# Patient Record
Sex: MALE | Race: OTHER | Employment: FULL TIME | URBAN - METROPOLITAN AREA
[De-identification: names, ages, dates, MRNs, and addresses within clinical notes are randomized per-mention and may not be internally consistent; named-entity substitution may affect disease eponyms.]

---

## 2017-02-10 PROBLEM — M54.2 NECK PAIN: Status: ACTIVE | Noted: 2017-02-10

## 2017-02-10 PROBLEM — M62.838 NECK MUSCLE SPASM: Status: ACTIVE | Noted: 2017-02-10

## 2017-03-09 PROBLEM — Z00.00 HEALTHCARE MAINTENANCE: Status: ACTIVE | Noted: 2017-03-09

## 2018-08-13 ENCOUNTER — HOSPITAL ENCOUNTER (OUTPATIENT)
Age: 48
Discharge: HOME | End: 2018-08-13
Payer: COMMERCIAL

## 2018-08-13 LAB
ABSOLUTE EOS #: 0.3 K/UL (ref 0–0.4)
ABSOLUTE IMMATURE GRANULOCYTE: ABNORMAL K/UL (ref 0–0.3)
ABSOLUTE LYMPH #: 1.5 K/UL (ref 1–4.8)
ABSOLUTE MONO #: 0.4 K/UL (ref 0.2–0.8)
ALBUMIN SERPL-MCNC: 4.1 G/DL (ref 3.5–5.2)
ALBUMIN/GLOBULIN RATIO: NORMAL (ref 1–2.5)
ALP BLD-CCNC: 76 U/L (ref 40–129)
ALT SERPL-CCNC: 12 U/L (ref 5–41)
ANION GAP SERPL CALCULATED.3IONS-SCNC: 8 MMOL/L (ref 9–17)
AST SERPL-CCNC: 18 U/L
BASOPHILS # BLD: 0 % (ref 0–2)
BASOPHILS ABSOLUTE: 0 K/UL (ref 0–0.2)
BILIRUB SERPL-MCNC: 0.48 MG/DL (ref 0.3–1.2)
BILIRUBIN DIRECT: 0.08 MG/DL
BILIRUBIN, INDIRECT: 0.4 MG/DL (ref 0–1)
BUN BLDV-MCNC: 11 MG/DL (ref 6–20)
BUN/CREAT BLD: 18 (ref 9–20)
CALCIUM SERPL-MCNC: 9.2 MG/DL (ref 8.6–10.4)
CHLORIDE BLD-SCNC: 103 MMOL/L (ref 98–107)
CHOLESTEROL, FASTING: 202 MG/DL
CHOLESTEROL/HDL RATIO: 3.5
CO2: 27 MMOL/L (ref 20–31)
CREAT SERPL-MCNC: 0.62 MG/DL (ref 0.7–1.2)
DIFFERENTIAL TYPE: ABNORMAL
EOSINOPHILS RELATIVE PERCENT: 4 % (ref 1–4)
GFR AFRICAN AMERICAN: >60 ML/MIN
GFR NON-AFRICAN AMERICAN: >60 ML/MIN
GFR SERPL CREATININE-BSD FRML MDRD: ABNORMAL ML/MIN/{1.73_M2}
GFR SERPL CREATININE-BSD FRML MDRD: ABNORMAL ML/MIN/{1.73_M2}
GLOBULIN: NORMAL G/DL (ref 1.5–3.8)
GLUCOSE FASTING: 89 MG/DL (ref 70–99)
HCT VFR BLD CALC: 41.3 % (ref 41–53)
HDLC SERPL-MCNC: 57 MG/DL
HEMOGLOBIN: 13.7 G/DL (ref 13.5–17.5)
IMMATURE GRANULOCYTES: ABNORMAL %
LDL CHOLESTEROL: 130 MG/DL (ref 0–130)
LYMPHOCYTES # BLD: 21 % (ref 24–44)
MCH RBC QN AUTO: 26.8 PG (ref 26–34)
MCHC RBC AUTO-ENTMCNC: 33.1 G/DL (ref 31–37)
MCV RBC AUTO: 80.9 FL (ref 80–100)
MONOCYTES # BLD: 6 % (ref 1–7)
NRBC AUTOMATED: ABNORMAL PER 100 WBC
PDW BLD-RTO: 13.2 % (ref 11.5–14.5)
PLATELET # BLD: 209 K/UL (ref 130–400)
PLATELET ESTIMATE: ABNORMAL
PMV BLD AUTO: 8.4 FL (ref 6–12)
POTASSIUM SERPL-SCNC: 4.2 MMOL/L (ref 3.7–5.3)
RBC # BLD: 5.1 M/UL (ref 4.5–5.9)
RBC # BLD: ABNORMAL 10*6/UL
SEG NEUTROPHILS: 69 % (ref 36–66)
SEGMENTED NEUTROPHILS ABSOLUTE COUNT: 5 K/UL (ref 1.8–7.7)
SODIUM BLD-SCNC: 138 MMOL/L (ref 135–144)
TOTAL PROTEIN: 7.2 G/DL (ref 6.4–8.3)
TRIGLYCERIDE, FASTING: 75 MG/DL
VLDLC SERPL CALC-MCNC: ABNORMAL MG/DL (ref 1–30)
WBC # BLD: 7.3 K/UL (ref 3.5–11)
WBC # BLD: ABNORMAL 10*3/UL

## 2018-08-13 PROCEDURE — 80076 HEPATIC FUNCTION PANEL: CPT

## 2018-08-13 PROCEDURE — 85025 COMPLETE CBC W/AUTO DIFF WBC: CPT

## 2018-08-13 PROCEDURE — 80048 BASIC METABOLIC PNL TOTAL CA: CPT

## 2018-08-13 PROCEDURE — 36415 COLL VENOUS BLD VENIPUNCTURE: CPT

## 2018-08-13 PROCEDURE — 80061 LIPID PANEL: CPT

## 2018-09-26 PROBLEM — Z00.00 HEALTHCARE MAINTENANCE: Status: RESOLVED | Noted: 2017-03-09 | Resolved: 2018-09-26

## 2019-07-17 ENCOUNTER — HOSPITAL ENCOUNTER (OUTPATIENT)
Age: 49
Discharge: HOME | End: 2019-07-17
Payer: COMMERCIAL

## 2019-07-17 LAB
ALBUMIN SERPL-MCNC: 4 G/DL (ref 3.5–5.2)
ALBUMIN/GLOBULIN RATIO: 1.3 (ref 1–2.5)
ALP BLD-CCNC: 80 U/L (ref 40–129)
ALT SERPL-CCNC: 13 U/L (ref 5–41)
ANION GAP SERPL CALCULATED.3IONS-SCNC: 9 MMOL/L (ref 9–17)
AST SERPL-CCNC: 17 U/L
BILIRUB SERPL-MCNC: 0.35 MG/DL (ref 0.3–1.2)
BILIRUBIN DIRECT: 0.08 MG/DL
BILIRUBIN, INDIRECT: 0.27 MG/DL (ref 0–1)
BUN BLDV-MCNC: 11 MG/DL (ref 6–20)
BUN/CREAT BLD: NORMAL (ref 9–20)
CALCIUM SERPL-MCNC: 9 MG/DL (ref 8.6–10.4)
CHLORIDE BLD-SCNC: 105 MMOL/L (ref 98–107)
CHOLESTEROL, FASTING: 186 MG/DL
CHOLESTEROL/HDL RATIO: 3.6
CO2: 27 MMOL/L (ref 20–31)
CREAT SERPL-MCNC: 0.82 MG/DL (ref 0.7–1.2)
GFR AFRICAN AMERICAN: >60 ML/MIN
GFR NON-AFRICAN AMERICAN: >60 ML/MIN
GFR SERPL CREATININE-BSD FRML MDRD: NORMAL ML/MIN/{1.73_M2}
GFR SERPL CREATININE-BSD FRML MDRD: NORMAL ML/MIN/{1.73_M2}
GLOBULIN: NORMAL G/DL (ref 1.5–3.8)
GLUCOSE FASTING: 87 MG/DL (ref 70–99)
HDLC SERPL-MCNC: 51 MG/DL
LDL CHOLESTEROL: 123 MG/DL (ref 0–130)
POTASSIUM SERPL-SCNC: 4.6 MMOL/L (ref 3.7–5.3)
SODIUM BLD-SCNC: 141 MMOL/L (ref 135–144)
TOTAL PROTEIN: 7 G/DL (ref 6.4–8.3)
TRIGLYCERIDE, FASTING: 60 MG/DL
VLDLC SERPL CALC-MCNC: NORMAL MG/DL (ref 1–30)

## 2019-07-17 PROCEDURE — 36415 COLL VENOUS BLD VENIPUNCTURE: CPT

## 2019-07-17 PROCEDURE — 80076 HEPATIC FUNCTION PANEL: CPT

## 2019-07-17 PROCEDURE — 80061 LIPID PANEL: CPT

## 2019-07-17 PROCEDURE — 80048 BASIC METABOLIC PNL TOTAL CA: CPT

## 2020-07-02 ENCOUNTER — HOSPITAL ENCOUNTER (OUTPATIENT)
Age: 50
Discharge: HOME | End: 2020-07-02
Payer: COMMERCIAL

## 2020-07-02 LAB
ALBUMIN SERPL-MCNC: 3.9 G/DL (ref 3.5–5.2)
ALBUMIN/GLOBULIN RATIO: 1.6 (ref 1–2.5)
ALP BLD-CCNC: 64 U/L (ref 40–129)
ALT SERPL-CCNC: 11 U/L (ref 5–41)
ANION GAP SERPL CALCULATED.3IONS-SCNC: 10 MMOL/L (ref 9–17)
AST SERPL-CCNC: 16 U/L
BILIRUB SERPL-MCNC: 0.58 MG/DL (ref 0.3–1.2)
BILIRUBIN DIRECT: 0.11 MG/DL
BILIRUBIN, INDIRECT: 0.47 MG/DL (ref 0–1)
BUN BLDV-MCNC: 11 MG/DL (ref 6–20)
BUN/CREAT BLD: NORMAL (ref 9–20)
CALCIUM SERPL-MCNC: 8.7 MG/DL (ref 8.6–10.4)
CHLORIDE BLD-SCNC: 103 MMOL/L (ref 98–107)
CHOLESTEROL/HDL RATIO: 3.2
CHOLESTEROL: 168 MG/DL
CO2: 26 MMOL/L (ref 20–31)
CREAT SERPL-MCNC: 0.89 MG/DL (ref 0.7–1.2)
GFR AFRICAN AMERICAN: >60 ML/MIN
GFR NON-AFRICAN AMERICAN: >60 ML/MIN
GFR SERPL CREATININE-BSD FRML MDRD: NORMAL ML/MIN/{1.73_M2}
GFR SERPL CREATININE-BSD FRML MDRD: NORMAL ML/MIN/{1.73_M2}
GLOBULIN: NORMAL G/DL (ref 1.5–3.8)
GLUCOSE BLD-MCNC: 89 MG/DL (ref 70–99)
HDLC SERPL-MCNC: 53 MG/DL
LDL CHOLESTEROL: 101 MG/DL (ref 0–130)
POTASSIUM SERPL-SCNC: 4.4 MMOL/L (ref 3.7–5.3)
SODIUM BLD-SCNC: 139 MMOL/L (ref 135–144)
TOTAL PROTEIN: 6.4 G/DL (ref 6.4–8.3)
TRIGL SERPL-MCNC: 69 MG/DL
VLDLC SERPL CALC-MCNC: NORMAL MG/DL (ref 1–30)

## 2020-07-02 PROCEDURE — 80076 HEPATIC FUNCTION PANEL: CPT

## 2020-07-02 PROCEDURE — 36415 COLL VENOUS BLD VENIPUNCTURE: CPT

## 2020-07-02 PROCEDURE — 80048 BASIC METABOLIC PNL TOTAL CA: CPT

## 2020-07-02 PROCEDURE — 80061 LIPID PANEL: CPT

## 2021-01-28 ENCOUNTER — HOSPITAL ENCOUNTER (OUTPATIENT)
Age: 51
Setting detail: SPECIMEN
Discharge: HOME | End: 2021-01-28
Payer: COMMERCIAL

## 2021-01-28 LAB
ABSOLUTE EOS #: 0.2 K/UL (ref 0–0.44)
ABSOLUTE IMMATURE GRANULOCYTE: <0.03 K/UL (ref 0–0.3)
ABSOLUTE LYMPH #: 1.34 K/UL (ref 1.1–3.7)
ABSOLUTE MONO #: 0.54 K/UL (ref 0.1–1.2)
ALBUMIN SERPL-MCNC: 3.8 G/DL (ref 3.5–5.2)
ALBUMIN/GLOBULIN RATIO: 1.3 (ref 1–2.5)
ALP BLD-CCNC: 80 U/L (ref 40–129)
ALT SERPL-CCNC: 11 U/L (ref 5–41)
ANION GAP SERPL CALCULATED.3IONS-SCNC: 8 MMOL/L (ref 9–17)
AST SERPL-CCNC: 16 U/L
BASOPHILS # BLD: 1 % (ref 0–2)
BASOPHILS ABSOLUTE: 0.05 K/UL (ref 0–0.2)
BILIRUB SERPL-MCNC: 0.42 MG/DL (ref 0.3–1.2)
BUN BLDV-MCNC: 11 MG/DL (ref 6–20)
BUN/CREAT BLD: ABNORMAL (ref 9–20)
CALCIUM SERPL-MCNC: 9.1 MG/DL (ref 8.6–10.4)
CHLORIDE BLD-SCNC: 103 MMOL/L (ref 98–107)
CO2: 29 MMOL/L (ref 20–31)
CREAT SERPL-MCNC: 0.77 MG/DL (ref 0.7–1.2)
DIFFERENTIAL TYPE: ABNORMAL
EOSINOPHILS RELATIVE PERCENT: 2 % (ref 1–4)
GFR AFRICAN AMERICAN: >60 ML/MIN
GFR NON-AFRICAN AMERICAN: >60 ML/MIN
GFR SERPL CREATININE-BSD FRML MDRD: ABNORMAL ML/MIN/{1.73_M2}
GFR SERPL CREATININE-BSD FRML MDRD: ABNORMAL ML/MIN/{1.73_M2}
GLUCOSE BLD-MCNC: 90 MG/DL (ref 70–99)
HCT VFR BLD CALC: 42.4 % (ref 40.7–50.3)
HEMOGLOBIN: 13.3 G/DL (ref 13–17)
IMMATURE GRANULOCYTES: 0 %
LYMPHOCYTES # BLD: 16 % (ref 24–43)
MCH RBC QN AUTO: 26.1 PG (ref 25.2–33.5)
MCHC RBC AUTO-ENTMCNC: 31.4 G/DL (ref 28.4–34.8)
MCV RBC AUTO: 83.1 FL (ref 82.6–102.9)
MONOCYTES # BLD: 6 % (ref 3–12)
NRBC AUTOMATED: 0 PER 100 WBC
PDW BLD-RTO: 12.6 % (ref 11.8–14.4)
PLATELET # BLD: 198 K/UL (ref 138–453)
PLATELET ESTIMATE: ABNORMAL
PMV BLD AUTO: 11 FL (ref 8.1–13.5)
POTASSIUM SERPL-SCNC: 4.3 MMOL/L (ref 3.7–5.3)
RBC # BLD: 5.1 M/UL (ref 4.21–5.77)
RBC # BLD: ABNORMAL 10*6/UL
SEG NEUTROPHILS: 75 % (ref 36–65)
SEGMENTED NEUTROPHILS ABSOLUTE COUNT: 6.26 K/UL (ref 1.5–8.1)
SODIUM BLD-SCNC: 140 MMOL/L (ref 135–144)
T3 FREE: 3.01 PG/ML (ref 2.02–4.43)
THYROXINE, FREE: 1.23 NG/DL (ref 0.93–1.7)
TOTAL PROTEIN: 6.7 G/DL (ref 6.4–8.3)
TSH SERPL DL<=0.05 MIU/L-ACNC: 0.87 MIU/L (ref 0.3–5)
VITAMIN D 25-HYDROXY: 59.1 NG/ML (ref 30–100)
WBC # BLD: 8.4 K/UL (ref 3.5–11.3)
WBC # BLD: ABNORMAL 10*3/UL

## 2021-01-28 PROCEDURE — 80053 COMPREHEN METABOLIC PANEL: CPT

## 2021-01-28 PROCEDURE — 84481 FREE ASSAY (FT-3): CPT

## 2021-01-28 PROCEDURE — 85025 COMPLETE CBC W/AUTO DIFF WBC: CPT

## 2021-01-28 PROCEDURE — 82306 VITAMIN D 25 HYDROXY: CPT

## 2021-01-28 PROCEDURE — 84439 ASSAY OF FREE THYROXINE: CPT

## 2021-01-28 PROCEDURE — 84443 ASSAY THYROID STIM HORMONE: CPT

## 2021-01-28 PROCEDURE — 36415 COLL VENOUS BLD VENIPUNCTURE: CPT

## 2021-02-02 ENCOUNTER — HOSPITAL ENCOUNTER (OUTPATIENT)
Dept: GENERAL RADIOLOGY | Age: 51
Discharge: HOME | End: 2021-02-04
Payer: COMMERCIAL

## 2021-02-02 ENCOUNTER — HOSPITAL ENCOUNTER (OUTPATIENT)
Age: 51
Discharge: HOME | End: 2021-02-04
Payer: COMMERCIAL

## 2021-02-02 DIAGNOSIS — M54.2 CERVICALGIA: ICD-10-CM

## 2021-02-02 PROCEDURE — 72040 X-RAY EXAM NECK SPINE 2-3 VW: CPT

## 2021-03-19 ENCOUNTER — IMMUNIZATION (OUTPATIENT)
Dept: PRIMARY CARE CLINIC | Age: 51
End: 2021-03-19
Payer: COMMERCIAL

## 2021-03-19 PROCEDURE — 0001A COVID-19, PFIZER VACCINE 30MCG/0.3ML DOSE: CPT | Performed by: INTERNAL MEDICINE

## 2021-03-19 PROCEDURE — 91300 COVID-19, PFIZER VACCINE 30MCG/0.3ML DOSE: CPT | Performed by: INTERNAL MEDICINE

## 2021-04-12 ENCOUNTER — IMMUNIZATION (OUTPATIENT)
Dept: PRIMARY CARE CLINIC | Age: 51
End: 2021-04-12
Payer: COMMERCIAL

## 2021-04-12 PROCEDURE — 0002A COVID-19, PFIZER VACCINE 30MCG/0.3ML DOSE: CPT | Performed by: INTERNAL MEDICINE

## 2021-04-12 PROCEDURE — 91300 COVID-19, PFIZER VACCINE 30MCG/0.3ML DOSE: CPT | Performed by: INTERNAL MEDICINE

## 2021-09-28 ENCOUNTER — APPOINTMENT (OUTPATIENT)
Dept: CT IMAGING | Age: 51
End: 2021-09-28
Payer: COMMERCIAL

## 2021-09-28 ENCOUNTER — HOSPITAL ENCOUNTER (EMERGENCY)
Age: 51
Discharge: HOME | End: 2021-09-29
Attending: EMERGENCY MEDICINE
Payer: COMMERCIAL

## 2021-09-28 DIAGNOSIS — T81.31XA DEHISCENCE OF OPERATIVE WOUND, INITIAL ENCOUNTER: Primary | ICD-10-CM

## 2021-09-28 DIAGNOSIS — L03.90 CELLULITIS, UNSPECIFIED CELLULITIS SITE: ICD-10-CM

## 2021-09-28 LAB
ABSOLUTE EOS #: 0.18 K/UL (ref 0–0.44)
ABSOLUTE IMMATURE GRANULOCYTE: 0.05 K/UL (ref 0–0.3)
ABSOLUTE LYMPH #: 1.52 K/UL (ref 1.1–3.7)
ABSOLUTE MONO #: 0.46 K/UL (ref 0.1–1.2)
ANION GAP SERPL CALCULATED.3IONS-SCNC: 8 MMOL/L (ref 9–17)
BASOPHILS # BLD: 1 % (ref 0–2)
BASOPHILS ABSOLUTE: 0.05 K/UL (ref 0–0.2)
BUN BLDV-MCNC: 19 MG/DL (ref 6–20)
BUN/CREAT BLD: 27 (ref 9–20)
CALCIUM SERPL-MCNC: 9.6 MG/DL (ref 8.6–10.4)
CHLORIDE BLD-SCNC: 102 MMOL/L (ref 98–107)
CO2: 29 MMOL/L (ref 20–31)
CREAT SERPL-MCNC: 0.71 MG/DL (ref 0.7–1.2)
DIFFERENTIAL TYPE: ABNORMAL
EOSINOPHILS RELATIVE PERCENT: 2 % (ref 1–4)
GFR AFRICAN AMERICAN: >60 ML/MIN
GFR NON-AFRICAN AMERICAN: >60 ML/MIN
GFR SERPL CREATININE-BSD FRML MDRD: ABNORMAL ML/MIN/{1.73_M2}
GFR SERPL CREATININE-BSD FRML MDRD: ABNORMAL ML/MIN/{1.73_M2}
GLUCOSE BLD-MCNC: 93 MG/DL (ref 70–99)
HCT VFR BLD CALC: 37.5 % (ref 40.7–50.3)
HEMOGLOBIN: 11.8 G/DL (ref 13–17)
IMMATURE GRANULOCYTES: 1 %
LYMPHOCYTES # BLD: 18 % (ref 24–43)
MCH RBC QN AUTO: 26 PG (ref 25.2–33.5)
MCHC RBC AUTO-ENTMCNC: 31.5 G/DL (ref 28.4–34.8)
MCV RBC AUTO: 82.6 FL (ref 82.6–102.9)
MONOCYTES # BLD: 6 % (ref 3–12)
NRBC AUTOMATED: 0 PER 100 WBC
PDW BLD-RTO: 12.9 % (ref 11.8–14.4)
PLATELET # BLD: 280 K/UL (ref 138–453)
PLATELET ESTIMATE: ABNORMAL
PMV BLD AUTO: 10 FL (ref 8.1–13.5)
POTASSIUM SERPL-SCNC: 4.1 MMOL/L (ref 3.7–5.3)
RBC # BLD: 4.54 M/UL (ref 4.21–5.77)
RBC # BLD: ABNORMAL 10*6/UL
SEG NEUTROPHILS: 72 % (ref 36–65)
SEGMENTED NEUTROPHILS ABSOLUTE COUNT: 6 K/UL (ref 1.5–8.1)
SODIUM BLD-SCNC: 139 MMOL/L (ref 135–144)
WBC # BLD: 8.3 K/UL (ref 3.5–11.3)
WBC # BLD: ABNORMAL 10*3/UL

## 2021-09-28 PROCEDURE — 80048 BASIC METABOLIC PNL TOTAL CA: CPT

## 2021-09-28 PROCEDURE — 85025 COMPLETE CBC W/AUTO DIFF WBC: CPT

## 2021-09-28 PROCEDURE — 70487 CT MAXILLOFACIAL W/DYE: CPT

## 2021-09-28 PROCEDURE — 6360000004 HC RX CONTRAST MEDICATION: Performed by: NURSE PRACTITIONER

## 2021-09-28 PROCEDURE — 99283 EMERGENCY DEPT VISIT LOW MDM: CPT

## 2021-09-28 PROCEDURE — 2580000003 HC RX 258: Performed by: NURSE PRACTITIONER

## 2021-09-28 RX ORDER — CEPHALEXIN 500 MG/1
500 CAPSULE ORAL 3 TIMES DAILY
Qty: 21 CAPSULE | Refills: 0 | Status: SHIPPED | OUTPATIENT
Start: 2021-09-28 | End: 2021-10-11 | Stop reason: ALTCHOICE

## 2021-09-28 RX ORDER — SODIUM CHLORIDE 0.9 % (FLUSH) 0.9 %
10 SYRINGE (ML) INJECTION ONCE
Status: COMPLETED | OUTPATIENT
Start: 2021-09-28 | End: 2021-09-28

## 2021-09-28 RX ORDER — 0.9 % SODIUM CHLORIDE 0.9 %
80 INTRAVENOUS SOLUTION INTRAVENOUS ONCE
Status: COMPLETED | OUTPATIENT
Start: 2021-09-28 | End: 2021-09-28

## 2021-09-28 RX ADMIN — SODIUM CHLORIDE 80 ML: 9 INJECTION, SOLUTION INTRAVENOUS at 22:26

## 2021-09-28 RX ADMIN — IOPAMIDOL 75 ML: 755 INJECTION, SOLUTION INTRAVENOUS at 22:25

## 2021-09-28 RX ADMIN — SODIUM CHLORIDE, PRESERVATIVE FREE 10 ML: 5 INJECTION INTRAVENOUS at 22:26

## 2021-09-28 ASSESSMENT — ENCOUNTER SYMPTOMS
SORE THROAT: 0
CONSTIPATION: 0
VOMITING: 0
SHORTNESS OF BREATH: 0
WHEEZING: 0
COUGH: 0
SINUS PRESSURE: 0
RHINORRHEA: 0
COLOR CHANGE: 0
ABDOMINAL PAIN: 0
DIARRHEA: 0
NAUSEA: 0

## 2021-09-28 NOTE — LETTER
[facility] ED  [address] [address]  Phone: [phone]             September 28, 2021    Patient: Angelina Medina   YOB: 1970   Date of Visit: 9/28/2021       To Whom It May Concern:    Angelina Medina was seen and treated in our emergency department on 9/28/2021.  He may return to work on October 4,2021      Sincerely,             Signature:__________________________________

## 2021-09-29 VITALS
RESPIRATION RATE: 16 BRPM | SYSTOLIC BLOOD PRESSURE: 160 MMHG | BODY MASS INDEX: 25.18 KG/M2 | TEMPERATURE: 97.8 F | WEIGHT: 190 LBS | OXYGEN SATURATION: 100 % | HEIGHT: 73 IN | HEART RATE: 74 BPM | DIASTOLIC BLOOD PRESSURE: 89 MMHG

## 2021-09-29 NOTE — ED PROVIDER NOTES
eMERGENCY dEPARTMENT eNCOUnter   Independent Attestation     Pt Name: Fernanda Lawson  MRN: [id number]  [place] 1970  Date of evaluation: 9/28/21     Fernanda Lawson is a 45 y.o. male with CC: Wound Check ([place] 2 weeks ago and stitches around ears have come out )      Based on the medical record the care appears appropriate. I was personally available for consultation in the Emergency Department.     Jacob Steel DO  Attending Emergency Physician                    Minesh Benavides [id number],   09/28/21 4458

## 2021-09-29 NOTE — ED PROVIDER NOTES
[facility] ED  eMERGENCY dEPARTMENT eNCOUnter      Pt Name: Troy Mills  MRN: [id number]  [place] 1970  Date of evaluation: 9/28/2021  Provider: Wilfredo Vanegas NP, MO - Giulia 1063       Chief Complaint   Patient presents with    Wound Check     [place] ([facility]) 2 weeks ago and stitches around ears have come out          HISTORY OF PRESENT ILLNESS  (Location/Symptom, Timing/Onset, Context/Setting, Quality, Duration, Modifying Factors, Severity.)   Troy Mills is a 45 y.o. male who presents to the emergency department vehicle for evaluation of dehiscence of wounds on his face. Patient had [place] facel[place] ([facility]) 2 weeks ago. He states along with his wounds have dehisced. He has a small open wound behind his right ear. He also has a 2 wounds to his left ear one behind and one in front. He states it has been draining some blood intermittently. He denies any associated fevers or chills. Nursing Notes were reviewed. ALLERGIES     Cat hair extract and Dust mite extract    CURRENT MEDICATIONS       Previous Medications    No medications on file       PAST MEDICAL HISTORY         Diagnosis Date    Asthma     Mediastinal mass        SURGICAL HISTORY           Procedure Laterality Date    APPENDECTOMY  2000    HERNIA REPAIR  2008    TONSILLECTOMY  2002         FAMILY HISTORY           Problem Relation Age of Onset    Heart Disease Mother     Diabetes Unknown     Cancer Father     Stroke Mother      No family status information on file. SOCIAL HISTORY      reports that he has never smoked. He has never used smokeless tobacco. He reports that he does not drink alcohol and does not use drugs. REVIEW OF SYSTEMS    (2-9 systems for level 4, 10 or more for level 5)     Review of Systems   Constitutional: Negative for chills, fever and unexpected weight change. HENT: Negative for congestion, rhinorrhea, sinus pressure and sore throat.     Respiratory: Negative for cough, shortness of breath and wheezing. Cardiovascular: Negative for chest pain and palpitations. Gastrointestinal: Negative for abdominal pain, constipation, diarrhea, nausea and vomiting. Genitourinary: Negative for dysuria and hematuria. Musculoskeletal: Negative for arthralgias and myalgias. Skin: Positive for wound. Negative for color change and rash. Neurological: Negative for dizziness, weakness and headaches. Hematological: Negative for adenopathy. All other systems reviewed and are negative. Except as noted above the remainder of the review of systems was reviewed and negative. PHYSICAL EXAM    (up to 7 for level 4, 8 or more for level 5)     ED Triage Vitals [09/28/21 2116]   BP Temp Temp src Pulse Resp SpO2 Height Weight   (!) 160/89 -- -- 74 16 100 % 6' 1\" (1.854 m) 190 lb (86.2 kg)       Physical Exam  Vitals reviewed. Constitutional:       Appearance: He is well-developed. HENT:      Head: Normocephalic and atraumatic. Eyes:      Conjunctiva/sclera: Conjunctivae normal.      Pupils: Pupils are equal, round, and reactive to light. Cardiovascular:      Rate and Rhythm: Normal rate and regular rhythm. Pulmonary:      Effort: Pulmonary effort is normal. No respiratory distress. Breath sounds: Normal breath sounds. No stridor. Abdominal:      General: Bowel sounds are normal.      Palpations: Abdomen is soft. Musculoskeletal:         General: Normal range of motion. Cervical back: Normal range of motion and neck supple. Lymphadenopathy:      Cervical: No cervical adenopathy. Skin:     General: Skin is warm and dry. Findings: No rash. Neurological:      Mental Status: He is alert and oriented to person, place, and time.            DIAGNOSTIC RESULTS     RADIOLOGY:   Non-plain film images such as CT, Ultrasound and MRI are read by the radiologist. Plain radiographic images are visualized and preliminarily interpreted by the emergency MEDICATIONS:     New Prescriptions    CEPHALEXIN (KEFLEX) 500 MG CAPSULE    Take 1 capsule by mouth 3 times daily           (Please note that portions of this note were completed with a voice recognition program.  Efforts were made to edit the dictations but occasionally words are mis-transcribed.)    [facility] NP, APRN - CNP  Certified Nurse Practitioner        MO Kearney CNP  09/28/21 0176

## 2021-09-30 ENCOUNTER — HOSPITAL ENCOUNTER (OUTPATIENT)
Dept: WOUND CARE | Age: 51
Discharge: HOME | End: 2021-09-30
Payer: COMMERCIAL

## 2021-09-30 DIAGNOSIS — T81.89XD SURGICAL WOUND, NON HEALING, SUBSEQUENT ENCOUNTER: Primary | ICD-10-CM

## 2021-09-30 DIAGNOSIS — S01.302D OPEN WOUND OF LEFT EAR, UNSPECIFIED OPEN WOUND TYPE, SUBSEQUENT ENCOUNTER: ICD-10-CM

## 2021-09-30 PROBLEM — S01.302A OPEN WOUND OF LEFT EAR: Status: ACTIVE | Noted: 2021-09-30

## 2021-09-30 PROBLEM — S01.309A EAR WOUND: Status: ACTIVE | Noted: 2021-09-30

## 2021-09-30 PROCEDURE — 99213 OFFICE O/P EST LOW 20 MIN: CPT

## 2021-09-30 PROCEDURE — 11042 DBRDMT SUBQ TIS 1ST 20SQCM/<: CPT | Performed by: NURSE PRACTITIONER

## 2021-09-30 PROCEDURE — 11042 DBRDMT SUBQ TIS 1ST 20SQCM/<: CPT

## 2021-09-30 PROCEDURE — 99202 OFFICE O/P NEW SF 15 MIN: CPT | Performed by: NURSE PRACTITIONER

## 2021-09-30 RX ORDER — LIDOCAINE HYDROCHLORIDE 20 MG/ML
JELLY TOPICAL ONCE
Status: COMPLETED | OUTPATIENT
Start: 2021-09-30 | End: 2021-09-30

## 2021-09-30 RX ORDER — LIDOCAINE HYDROCHLORIDE 20 MG/ML
JELLY TOPICAL ONCE
Status: CANCELLED | OUTPATIENT
Start: 2021-09-30 | End: 2021-09-30

## 2021-09-30 RX ORDER — LIDOCAINE HYDROCHLORIDE 40 MG/ML
SOLUTION TOPICAL ONCE
Status: CANCELLED | OUTPATIENT
Start: 2021-09-30 | End: 2021-09-30

## 2021-09-30 RX ORDER — METOPROLOL SUCCINATE 50 MG/1
50 TABLET, EXTENDED RELEASE ORAL DAILY
COMMUNITY
Start: 2021-08-02

## 2021-09-30 RX ORDER — TIZANIDINE 4 MG/1
4 TABLET ORAL 2 TIMES DAILY PRN
COMMUNITY
Start: 2021-07-28

## 2021-09-30 RX ADMIN — LIDOCAINE HYDROCHLORIDE 6 ML: 20 JELLY TOPICAL at 11:37

## 2021-09-30 NOTE — PROGRESS NOTES
[address]   Progress Note and Procedure Note      [facility] RECORD NUMBER:  [id number]  AGE: 45 y.o. GENDER: male  : 1970  EPISODE DATE:  2021    Subjective:     Chief Complaint   Patient presents with    Wound Check     bilateral posterior ears         HISTORY of PRESENT ILLNESS HPI     Ramírez Holland is a 45 y.o. male who presents today for wound/ulcer evaluation. History of Wound Context: presents to wound clinic today for evaluation of bilateral posterior non healing surgical wounds after facelift in [facility] [place] 2 weeks ago. Currently keeping wounds covered. Was seen in ER 2021 for wound dehiscence, started on keflex which he is taking as prescribed.    Wound/Ulcer Pain Timing/Severity: intermittent  Quality of pain: aching, burning  Severity:  3 / 10   Modifying Factors: Pain worsens with debridement  Associated Signs/Symptoms: none    Ulcer Identification:  Ulcer Type: non-healing surgical  Contributing Factors: none    Wound: N/A        PAST MEDICAL HISTORY        Diagnosis Date    Asthma     Mediastinal mass        PAST SURGICAL HISTORY    Past Surgical History:   Procedure Laterality Date    APPENDECTOMY  2000    HERNIA REPAIR  2008    TONSILLECTOMY  2002       FAMILY HISTORY    Family History   Problem Relation Age of Onset    Heart Disease Mother     Stroke Mother     Diabetes Other     Cancer Father        SOCIAL HISTORY    Social History     Tobacco Use    Smoking status: Never Smoker    Smokeless tobacco: Never Used   Substance Use Topics    Alcohol use: No    Drug use: No       ALLERGIES    Allergies   Allergen Reactions    Cat Hair Extract     Dust Mite Extract        MEDICATIONS    Current Outpatient Medications on File Prior to Encounter   Medication Sig Dispense Refill    metoprolol succinate (TOPROL XL) 50 MG extended release tablet Take 50 mg by mouth daily      tiZANidine (ZANAFLEX) 4 MG tablet Take 4 mg by mouth 2 times daily as needed      cephALEXin (KEFLEX) 500 MG capsule Take 1 capsule by mouth 3 times daily 21 capsule 0     No current facility-administered medications on file prior to encounter. REVIEW OF SYSTEMS    Constitutional: negative  Eyes: negative  Ears, nose, mouth, throat, and face: negative  Respiratory: negative  Cardiovascular: negative  Gastrointestinal: negative  Genitourinary:negative  Integument/breast: negative except for bilateral posterior ear wounds   Hematologic/lymphatic: negative  Musculoskeletal:negative  Neurological: negative  Behavioral/Psych: negative  Endocrine: negative  Allergic/Immunologic: negative    Objective: There were no vitals taken for this visit.     Wt Readings from Last 3 Encounters:   09/28/21 190 lb (86.2 kg)   03/15/17 205 lb 6 oz (93.2 kg)   03/09/17 204 lb 8 oz (92.8 kg)       PHYSICAL EXAM    General Appearance: alert and oriented to person, place and time, well developed and well- nourished, in no acute distress  Skin: warm and dry, no rash or erythema, bilateral posterior ear wounds   Head: normocephalic and atraumatic  Eyes: pupils equal, round, extraocular eye movements intact, and conjunctivae normal  Pulmonary/Chest: clear to auscultation bilaterally- no wheezes, rales or rhonchi, normal air movement, no respiratory distress  Cardiovascular: normal rate, regular rhythm, normal S1 and S2, no murmurs  Abdomen: soft, non-tender, non-distended, normal bowel sounds  Extremities: no cyanosis, clubbing or edema  Musculoskeletal: no joint swelling, deformity or tenderness  Neurologic: gait, coordination and speech normal      Assessment:     Problem List Items Addressed This Visit     Ear wound    Relevant Orders    Initiate Outpatient Wound Care Protocol    Surgical wound, non healing, subsequent encounter - Primary    Relevant Orders    Initiate Outpatient Wound Care Protocol           Procedure Note  Indications:  Based on my examination of this patient's wound(s)/ulcer(s) today, debridement is required to promote healing and evaluate the wound base. Performed by: MO Milner CNP    Consent obtained:  Yes    Time out taken:  Yes    Pain Control: Anesthetic  Anesthetic: 2% Lidocaine Gel Topical     Debridement:Excisional Debridement    Using curette the wound(s)/ulcer(s) was/were sharply debrided down through and including the removal of subcutaneous tissue. Devitalized Tissue Debrided:  fibrin, biofilm and slough    Pre Debridement Measurements:  Are located in the [facility]  Documentation Flow Sheet    Wound/Ulcer #: 1 and 2    Post Debridement Measurements:  Wound/Ulcer Descriptions are Pre Debridement except measurements:    Wound 09/30/21 Ear Lower;Right;Posterior #1 Right ear (Active)   Wound Image   09/30/21 1111   Wound Etiology Surgical 09/30/21 1111   Wound Cleansed Cleansed with saline 09/30/21 1111   Wound Length (cm) 2.2 cm 09/30/21 1111   Wound Width (cm) 0.8 cm 09/30/21 1111   Wound Depth (cm) 0.6 cm 09/30/21 1111   Wound Surface Area (cm^2) 1.76 cm^2 09/30/21 1111   Wound Volume (cm^3) 1. 056 cm^3 09/30/21 1111   Post-Procedure Length (cm) 2.2 cm 09/30/21 1111   Post-Procedure Width (cm) 0.8 cm 09/30/21 1111   Post-Procedure Depth (cm) 0.6 cm 09/30/21 1111   Post-Procedure Surface Area (cm^2) 1.76 cm^2 09/30/21 1111   Post-Procedure Volume (cm^3) 1. 056 cm^3 09/30/21 1111   Wound Assessment Pink/red;Slough 09/30/21 1111   Drainage Amount Moderate 09/30/21 1111   Drainage Description Purulent 09/30/21 1111   Odor Mild 09/30/21 1111   Maria Del Carmen-wound Assessment Blanchable erythema 09/30/21 1111   Margins Defined edges 09/30/21 1111   Wound Thickness Description not for Pressure Injury Full thickness 09/30/21 1111   Number of days: 0       Wound 09/30/21 Ear Left; Lower; Posterior #2 Left ear (Active)   Wound Image   09/30/21 1111   Wound Etiology Surgical 09/30/21 1111   Wound Cleansed Irrigated with saline 09/30/21 1111   Wound Length (cm) 3.5 cm 09/30/21 1111   Wound Width (cm) 0.9 cm 09/30/21 1111   Wound Depth (cm) 0.4 cm 09/30/21 1111   Wound Surface Area (cm^2) 3.15 cm^2 09/30/21 1111   Wound Volume (cm^3) 1.26 cm^3 09/30/21 1111   Post-Procedure Length (cm) 3.5 cm 09/30/21 1111   Post-Procedure Width (cm) 0.9 cm 09/30/21 1111   Post-Procedure Depth (cm) 0.4 cm 09/30/21 1111   Post-Procedure Surface Area (cm^2) 3.15 cm^2 09/30/21 1111   Post-Procedure Volume (cm^3) 1.26 cm^3 09/30/21 1111   Undermining Starts ___ O'Clock 6 09/30/21 1111   Undermining Ends___ O'Clock 9 09/30/21 1111   Undermining Maxium Distance (cm) 1.0 @7 09/30/21 1111   Wound Assessment Pink/red 09/30/21 1111   Drainage Amount Moderate 09/30/21 1111   Drainage Description Serosanguinous 09/30/21 1111   Odor None 09/30/21 1111   Maria Del Carmen-wound Assessment Blanchable erythema 09/30/21 1111   Margins Defined edges 09/30/21 1111   Wound Thickness Description not for Pressure Injury Full thickness 09/30/21 1111   Number of days: 0          Percent of Wound(s)/Ulcer(s) Debrided: 100%    Total Surface Area Debrided:  4.91 sq cm     Estimated Blood Loss:  Minimal    Hemostasis Achieved:  by pressure    Procedural Pain:  3  / 10     Post Procedural Pain:  0 / 10     Response to treatment:  Well tolerated by patient. Plan:     Treatment Note please see Discharge Instructions    Written patient dismissal instructions given to patient and signed by patient or POA.            Electronically signed by MO Prince CNP on 9/30/2021 at 11:37 AM

## 2021-10-04 ENCOUNTER — HOSPITAL ENCOUNTER (OUTPATIENT)
Dept: WOUND CARE | Age: 51
Discharge: HOME | End: 2021-10-04
Payer: COMMERCIAL

## 2021-10-04 VITALS
DIASTOLIC BLOOD PRESSURE: 82 MMHG | RESPIRATION RATE: 18 BRPM | BODY MASS INDEX: 25.18 KG/M2 | SYSTOLIC BLOOD PRESSURE: 147 MMHG | WEIGHT: 190 LBS | HEIGHT: 73 IN | HEART RATE: 79 BPM | TEMPERATURE: 97.9 F

## 2021-10-04 DIAGNOSIS — T81.89XD SURGICAL WOUND, NON HEALING, SUBSEQUENT ENCOUNTER: Primary | ICD-10-CM

## 2021-10-04 DIAGNOSIS — S01.302D OPEN WOUND OF LEFT EAR, UNSPECIFIED OPEN WOUND TYPE, SUBSEQUENT ENCOUNTER: ICD-10-CM

## 2021-10-04 PROCEDURE — 11042 DBRDMT SUBQ TIS 1ST 20SQCM/<: CPT | Performed by: PLASTIC SURGERY

## 2021-10-04 PROCEDURE — 11045 DBRDMT SUBQ TISS EACH ADDL: CPT | Performed by: PLASTIC SURGERY

## 2021-10-04 PROCEDURE — 11042 DBRDMT SUBQ TIS 1ST 20SQCM/<: CPT

## 2021-10-04 PROCEDURE — 11045 DBRDMT SUBQ TISS EACH ADDL: CPT

## 2021-10-04 RX ORDER — LIDOCAINE HYDROCHLORIDE 20 MG/ML
JELLY TOPICAL ONCE
Status: CANCELLED | OUTPATIENT
Start: 2021-10-04 | End: 2021-10-04

## 2021-10-04 RX ORDER — LIDOCAINE HYDROCHLORIDE 40 MG/ML
SOLUTION TOPICAL ONCE
Status: CANCELLED | OUTPATIENT
Start: 2021-10-04 | End: 2021-10-04

## 2021-10-04 RX ORDER — LIDOCAINE HYDROCHLORIDE 20 MG/ML
JELLY TOPICAL ONCE
Status: COMPLETED | OUTPATIENT
Start: 2021-10-04 | End: 2021-10-04

## 2021-10-04 RX ADMIN — LIDOCAINE HYDROCHLORIDE 6 ML: 20 JELLY TOPICAL at 10:04

## 2021-10-04 ASSESSMENT — PAIN DESCRIPTION - ORIENTATION: ORIENTATION: RIGHT

## 2021-10-04 ASSESSMENT — PAIN DESCRIPTION - ONSET: ONSET: ON-GOING

## 2021-10-04 ASSESSMENT — PAIN - FUNCTIONAL ASSESSMENT: PAIN_FUNCTIONAL_ASSESSMENT: ACTIVITIES ARE NOT PREVENTED

## 2021-10-04 ASSESSMENT — PAIN DESCRIPTION - PROGRESSION: CLINICAL_PROGRESSION: NOT CHANGED

## 2021-10-04 ASSESSMENT — PAIN DESCRIPTION - DESCRIPTORS: DESCRIPTORS: ACHING

## 2021-10-04 ASSESSMENT — PAIN DESCRIPTION - FREQUENCY: FREQUENCY: INTERMITTENT

## 2021-10-04 ASSESSMENT — PAIN DESCRIPTION - LOCATION: LOCATION: EAR

## 2021-10-04 ASSESSMENT — PAIN SCALES - GENERAL: PAINLEVEL_OUTOF10: 5

## 2021-10-04 ASSESSMENT — PAIN DESCRIPTION - PAIN TYPE: TYPE: ACUTE PAIN

## 2021-10-04 NOTE — PLAN OF CARE
Problem: Pain:  Description: Pain management should include both nonpharmacologic and pharmacologic interventions.   Goal: Pain level will decrease  Description: Pain level will decrease  Outcome: Ongoing  Goal: Control of acute pain  Description: Control of acute pain  Outcome: Ongoing  Goal: Control of chronic pain  Description: Control of chronic pain  Outcome: Ongoing     Problem: Wound:  Goal: Will show signs of wound healing; wound closure and no evidence of infection  Description: Will show signs of wound healing; wound closure and no evidence of infection  Outcome: Ongoing     Problem: Falls - Risk of:  Goal: Will remain free from falls  Description: Will remain free from falls  Outcome: Ongoing

## 2021-10-04 NOTE — LETTER
WORK RELEASE  [facility] WOUND [facility]ARE  [address] [phone]  Dept: [address] RECORD NUMBER: [id number]   AGE: 45 y.o. GENDER: male : 1970   TODAYS DATE: 10/4/2021       Mr. Dank Lazo was seen in the [facility] on 10/4/2021     Patient may not return to work until he is reevaluated on 10/11/2021. If you have any questions please feel free to contact us at the above number.       Sincerely,       Jenny Diaz MD on 10/4/2021 at 10:40 AM    [address] [place] [address] Pkwy and [facility]

## 2021-10-04 NOTE — PROGRESS NOTES
[address]       Progress Note and Procedure Note      Progress note     Chief Complaint   Patient presents with    Wound Check     bilateral ears        HPI:   Jovany Knapp is a 45 y.o. male who presents status post facelift. Patient has weakness on the right face. Patient also has open wounds postauricular and inferior auricular. Patient is here to discuss all the issues. Patient has burning associated with it. The pain worsens with debridement. Medications:     Current Outpatient Medications   Medication Sig Dispense Refill    metoprolol succinate (TOPROL XL) 50 MG extended release tablet Take 50 mg by mouth daily      tiZANidine (ZANAFLEX) 4 MG tablet Take 4 mg by mouth 2 times daily as needed      cephALEXin (KEFLEX) 500 MG capsule Take 1 capsule by mouth 3 times daily 21 capsule 0     No current facility-administered medications for this encounter. Allergies: Allergies   Allergen Reactions    Cat Hair Extract     Dust Mite Extract      Review of Systems:   Constitutional: Negative for fever, chills, fatigue and unexpected weight change. HENT: Negative for hearing loss, sore throat and facial swelling. Eyes: Negative for pain and discharge. Respiratory: Patient has a history of asthma. Cardiovascular: Negative for chest pain. Gastrointestinal: Negative for nausea, vomiting, diarrhea and constipation. Skin: Negative for pallor and rash. Neurological: Negative for seizures, syncope and numbness. Hematological: Does not bruise/bleed easily. Psychiatric/Behavioral: Negative for behavioral problems. The patient is not nervous/anxious.       Past Medical History:   Diagnosis Date    Asthma     Mediastinal mass      Past Surgical History:   Procedure Laterality Date    APPENDECTOMY  2000    HERNIA REPAIR  2008    TONSILLECTOMY  2002     Social History     Socioeconomic History    Marital status:      Spouse name: Not on file    distress. Neurological:  Patient has weakness on the right side of the face over the facial nerve starting from the zygomatic branch to the mandibular branch. Psychiatric: Normal mood and affect. Behavior is normal      Post Debridement Measurements:  Wound/Ulcer Descriptions are Pre Debridement except measurements:    Wound 09/30/21 Ear Right; Lower; Posterior #1 CLUSTER (Active)   Wound Image   09/30/21 1111   Wound Etiology Non-Healing Surgical 10/04/21 1000   Dressing Status New drainage noted; Old drainage noted 10/04/21 1000   Wound Cleansed Cleansed with saline 10/04/21 1000   Wound Length (cm) 6 cm 10/04/21 1000   Wound Width (cm) 3 cm 10/04/21 1000   Wound Depth (cm) 0.3 cm 10/04/21 1000   Wound Surface Area (cm^2) 18 cm^2 10/04/21 1000   Change in Wound Size % (l*w) -922.73 10/04/21 1000   Wound Volume (cm^3) 5.4 cm^3 10/04/21 1000   Wound Healing % -411 10/04/21 1000   Post-Procedure Length (cm) 6 cm 10/04/21 1000   Post-Procedure Width (cm) 3 cm 10/04/21 1000   Post-Procedure Depth (cm) 0.3 cm 10/04/21 1000   Post-Procedure Surface Area (cm^2) 18 cm^2 10/04/21 1000   Post-Procedure Volume (cm^3) 5.4 cm^3 10/04/21 1000   Wound Assessment Pink/red;Slough 10/04/21 1000   Drainage Amount Moderate 10/04/21 1000   Drainage Description Serosanguinous 10/04/21 1000   Odor None 10/04/21 1000   Maria Del Carmen-wound Assessment Blanchable erythema 10/04/21 1000   Margins Defined edges 10/04/21 1000   Wound Thickness Description not for Pressure Injury Full thickness 10/04/21 1000   Number of days: 3       Wound 09/30/21 Ear Left; Lower; Posterior #2  (Active)   Wound Image   09/30/21 1111   Wound Etiology Non-Healing Surgical 10/04/21 1000   Dressing Status New drainage noted; Old drainage noted 10/04/21 1000   Wound Cleansed Irrigated with saline 09/30/21 1111   Wound Length (cm) 3.2 cm 10/04/21 1000   Wound Width (cm) 1.6 cm 10/04/21 1000   Wound Depth (cm) 0.1 cm 10/04/21 1000   Wound Surface Area (cm^2) 5.12 cm^2 10/04/21 1000   Change in Wound Size % (l*w) -62.54 10/04/21 1000   Wound Volume (cm^3) 0.512 cm^3 10/04/21 1000   Wound Healing % 59 10/04/21 1000   Post-Procedure Length (cm) 3.2 cm 10/04/21 1000   Post-Procedure Width (cm) 1.6 cm 10/04/21 1000   Post-Procedure Depth (cm) 0.1 cm 10/04/21 1000   Post-Procedure Surface Area (cm^2) 5.12 cm^2 10/04/21 1000   Post-Procedure Volume (cm^3) 0.512 cm^3 10/04/21 1000   Undermining Starts ___ O'Clock 7 10/04/21 1000   Undermining Ends___ O'Clock 9 10/04/21 1000   Undermining Maxium Distance (cm) 0.8 @7 10/04/21 1000   Wound Assessment Pink/red 10/04/21 1000   Drainage Amount Moderate 10/04/21 1000   Drainage Description Serosanguinous 10/04/21 1000   Odor None 10/04/21 1000   Maria Del Carmen-wound Assessment Blanchable erythema 10/04/21 1000   Margins Defined edges 10/04/21 1000   Wound Thickness Description not for Pressure Injury Full thickness 10/04/21 1000   Number of days: 3          Procedure Note  Indications:  Based on my examination of this patient's wound(s)/ulcer(s) today, debridement is required to promote healing and evaluate the wound base. Performed by: Tonie Peters MD    Consent obtained:  Yes    Time out taken:  Yes    Pain Control: Anesthetic  Anesthetic: 2% Lidocaine Gel Topical       Percent of Wound(s)/Ulcer(s) Debrided: 100%    Total Surface Area Debrided: Right posterior ear 18 sq cm to the subcutaneous tissue, left posterior ear 5.2 cm² to subcutaneous    Diabetic/Pressure/Non Pressure Ulcers only:  Ulcer: Non-Pressure ulcer, fat layer exposed      Estimated Blood Loss:  Minimal    Hemostasis Achieved:  by pressure    Procedural Pain:  0  / 10     Post Procedural Pain:  0 / 10     Response to treatment:  Well tolerated by patient.          Imaging:   [unfilled]        Impression/Plan:     Problem List Items Addressed This Visit     Surgical wound, non healing, subsequent encounter - Primary    Relevant Orders    Initiate Outpatient Wound Care Protocol    Ear wound    Relevant Orders    Initiate Outpatient Wound Care Protocol          Patient Active Problem List   Diagnosis    Asthma    Mediastinal mass    Dyslipidemia    Uncomplicated asthma    Neck pain    Neck muscle spasm    Surgical wound, non healing, subsequent encounter    Ear wound     Plan:  Patient will make an appointment with neurology to perform studies such as EMG. We will place Lizzie and Zantac. Patient is to follow-up in 1 week. Patient was given time off for 1 week.        Electronically signed by:  Timothy Rendon MD 10/4/2021

## 2021-10-04 NOTE — PROGRESS NOTES
[address]:     [employer] [id number] [address] p: [phone] f: [phone]     Ordering Center:     [facility]  Brayan Miramontes 124 [address] [facility]  [phone]  WOUND CARE Dept: [phone]   FAX NUMBER [unfilled]    Patient Information:      Jovany Kochlichristofer Pickering.  Bruce Covarrubias  [phone]   : 1970  AGE: 45 y.o. GENDER: male   TODAYS DATE:  10/4/2021    Insurance:      PRIMARY INSURANCE:  Plan: BCBS OUT OF STATE  Coverage: BCBS  Effective Date: 2015  [id number] - (BX Traditional)      Patient Wound Information:      Problem List Items Addressed This Visit     Surgical wound, non healing, subsequent encounter - Primary    Relevant Orders    Initiate Outpatient Wound Care Protocol    Ear wound    Relevant Orders    Initiate Outpatient Wound Care Protocol          WOUNDS REQUIRING DRESSING SUPPLIES:     Wound 21 Ear Right; Lower; Posterior #1 CLUSTER (Active)   Wound Image   21 1111   Wound Etiology Non-Healing Surgical 10/04/21 1000   Dressing Status New drainage noted; Old drainage noted 10/04/21 1000   Wound Cleansed Cleansed with saline 10/04/21 1000   Wound Length (cm) 6 cm 10/04/21 1000   Wound Width (cm) 3 cm 10/04/21 1000   Wound Depth (cm) 0.3 cm 10/04/21 1000   Wound Surface Area (cm^2) 18 cm^2 10/04/21 1000   Change in Wound Size % (l*w) -922.73 10/04/21 1000   Wound Volume (cm^3) 5.4 cm^3 10/04/21 1000   Wound Healing % -411 10/04/21 1000   Post-Procedure Length (cm) 6 cm 10/04/21 1000   Post-Procedure Width (cm) 3 cm 10/04/21 1000   Post-Procedure Depth (cm) 0.3 cm 10/04/21 1000   Post-Procedure Surface Area (cm^2) 18 cm^2 10/04/21 1000   Post-Procedure Volume (cm^3) 5.4 cm^3 10/04/21 1000   Wound Assessment Pink/red;Slough 10/04/21 1000   Drainage Amount Moderate 10/04/21 1000   Drainage Description Serosanguinous 10/04/21 1000   Odor None 10/04/21 1000   Maria Del Carmen-wound Assessment Blanchable erythema 10/04/21 1000   Margins Defined edges 10/04/21 1000   Wound Thickness Description not for Pressure Injury Full thickness 10/04/21 1000   Number of days: 3       Wound 09/30/21 Ear Left; Lower; Posterior #2  (Active)   Wound Image   09/30/21 1111   Wound Etiology Non-Healing Surgical 10/04/21 1000   Dressing Status New drainage noted; Old drainage noted 10/04/21 1000   Wound Cleansed Irrigated with saline 09/30/21 1111   Wound Length (cm) 3.2 cm 10/04/21 1000   Wound Width (cm) 1.6 cm 10/04/21 1000   Wound Depth (cm) 0.1 cm 10/04/21 1000   Wound Surface Area (cm^2) 5.12 cm^2 10/04/21 1000   Change in Wound Size % (l*w) -62.54 10/04/21 1000   Wound Volume (cm^3) 0.512 cm^3 10/04/21 1000   Wound Healing % 59 10/04/21 1000   Post-Procedure Length (cm) 3.2 cm 10/04/21 1000   Post-Procedure Width (cm) 1.6 cm 10/04/21 1000   Post-Procedure Depth (cm) 0.1 cm 10/04/21 1000   Post-Procedure Surface Area (cm^2) 5.12 cm^2 10/04/21 1000   Post-Procedure Volume (cm^3) 0.512 cm^3 10/04/21 1000   Undermining Starts ___ O'Clock 7 10/04/21 1000   Undermining Ends___ O'Clock 9 10/04/21 1000   Undermining Maxium Distance (cm) 0.8 @7 10/04/21 1000   Wound Assessment Pink/red 10/04/21 1000   Drainage Amount Moderate 10/04/21 1000   Drainage Description Serosanguinous 10/04/21 1000   Odor None 10/04/21 1000   Maria Del Carmen-wound Assessment Blanchable erythema 10/04/21 1000   Margins Defined edges 10/04/21 1000   Wound Thickness Description not for Pressure Injury Full thickness 10/04/21 1000   Number of days: 3          Supplies Requested :      WOUND #: 1 and 2   PRIMARY DRESSING:  Collagen with silver (tom)   Cover and Secure with:  Other Gentac or comparable silicone gauze dressing     FREQUENCY OF DRESSING CHANGES:  Daily         ADDITIONAL ITEMS:  [] Gloves Small  [x] Gloves Medium [] Gloves Large [] Gloves XLarge  [] Tape 1\" [x] Tape 2\" [] Tape 3\"  [] Medipore Tape  [x] Saline  [] Skin Prep   [] Adhesive Remover

## 2021-10-11 ENCOUNTER — HOSPITAL ENCOUNTER (OUTPATIENT)
Dept: WOUND CARE | Age: 51
Discharge: HOME | End: 2021-10-11
Payer: COMMERCIAL

## 2021-10-11 VITALS
BODY MASS INDEX: 25.18 KG/M2 | RESPIRATION RATE: 17 BRPM | DIASTOLIC BLOOD PRESSURE: 65 MMHG | HEART RATE: 71 BPM | TEMPERATURE: 97.6 F | WEIGHT: 190 LBS | SYSTOLIC BLOOD PRESSURE: 123 MMHG | HEIGHT: 73 IN

## 2021-10-11 DIAGNOSIS — S01.302D OPEN WOUND OF LEFT EAR, UNSPECIFIED OPEN WOUND TYPE, SUBSEQUENT ENCOUNTER: ICD-10-CM

## 2021-10-11 DIAGNOSIS — T81.89XD SURGICAL WOUND, NON HEALING, SUBSEQUENT ENCOUNTER: Primary | ICD-10-CM

## 2021-10-11 PROCEDURE — 99213 OFFICE O/P EST LOW 20 MIN: CPT | Performed by: PLASTIC SURGERY

## 2021-10-11 PROCEDURE — 99213 OFFICE O/P EST LOW 20 MIN: CPT

## 2021-10-11 RX ORDER — LIDOCAINE HYDROCHLORIDE 40 MG/ML
SOLUTION TOPICAL ONCE
Status: CANCELLED | OUTPATIENT
Start: 2021-10-11 | End: 2021-10-11

## 2021-10-11 RX ORDER — LIDOCAINE HYDROCHLORIDE 20 MG/ML
JELLY TOPICAL ONCE
Status: COMPLETED | OUTPATIENT
Start: 2021-10-11 | End: 2021-10-11

## 2021-10-11 RX ORDER — LIDOCAINE HYDROCHLORIDE 20 MG/ML
JELLY TOPICAL ONCE
Status: CANCELLED | OUTPATIENT
Start: 2021-10-11 | End: 2021-10-11

## 2021-10-11 RX ADMIN — LIDOCAINE HYDROCHLORIDE 6 ML: 20 JELLY TOPICAL at 08:46

## 2021-10-11 ASSESSMENT — PAIN DESCRIPTION - ONSET: ONSET: ON-GOING

## 2021-10-11 ASSESSMENT — PAIN DESCRIPTION - ORIENTATION: ORIENTATION: RIGHT;LEFT

## 2021-10-11 ASSESSMENT — PAIN SCALES - GENERAL: PAINLEVEL_OUTOF10: 3

## 2021-10-11 ASSESSMENT — PAIN DESCRIPTION - DESCRIPTORS: DESCRIPTORS: ACHING

## 2021-10-11 ASSESSMENT — PAIN - FUNCTIONAL ASSESSMENT: PAIN_FUNCTIONAL_ASSESSMENT: ACTIVITIES ARE NOT PREVENTED

## 2021-10-11 ASSESSMENT — PAIN DESCRIPTION - PROGRESSION: CLINICAL_PROGRESSION: NOT CHANGED

## 2021-10-11 ASSESSMENT — PAIN DESCRIPTION - FREQUENCY: FREQUENCY: INTERMITTENT

## 2021-10-11 ASSESSMENT — PAIN DESCRIPTION - PAIN TYPE: TYPE: ACUTE PAIN

## 2021-10-11 ASSESSMENT — PAIN DESCRIPTION - LOCATION: LOCATION: EAR

## 2021-10-11 NOTE — LETTER
WORK RELEASE  [facility]   [address] [place] [address]  [phone]  Dept: [address] RECORD NUMBER: [id number]   AGE: 45 y.o. GENDER: male : 1970   TODAYS DATE: 10/11/2021       Mr. Jamie Finn was seen in the [facility] [address] on 10/11/2021     Patient may remain off of work until he is reevaluated on 10/18/2021.       Sincerely,      Robert Soriano MD      [address] [place] [address] Pkwy and [facility] Therapy

## 2021-10-11 NOTE — PROGRESS NOTES
[address]       Progress Note and Procedure Note      Progress note     Chief Complaint   Patient presents with    Wound Check     b/l ears        HPI:   Meenakshi Miramontes is a 45 y.o. male who presents status post facelift. Patient has weakness on the right face. Patient also has open wounds postauricular and inferior auricular. Patient is here to discuss all the issues. Patient has burning associated with it. The pain worsens with debridement. Medications:     Current Outpatient Medications   Medication Sig Dispense Refill    metoprolol succinate (TOPROL XL) 50 MG extended release tablet Take 50 mg by mouth daily      tiZANidine (ZANAFLEX) 4 MG tablet Take 4 mg by mouth 2 times daily as needed       No current facility-administered medications for this encounter. Allergies: Allergies   Allergen Reactions    Cat Hair Extract     Dust Mite Extract      Review of Systems:   Constitutional: Negative for fever, chills, fatigue and unexpected weight change. HENT: Negative for hearing loss, sore throat and facial swelling. Eyes: Negative for pain and discharge. Respiratory: Patient has a history of asthma. Cardiovascular: Negative for chest pain. Gastrointestinal: Negative for nausea, vomiting, diarrhea and constipation. Skin: Negative for pallor and rash. Neurological: Negative for seizures, syncope and numbness. Hematological: Does not bruise/bleed easily. Psychiatric/Behavioral: Negative for behavioral problems. The patient is not nervous/anxious.       Past Medical History:   Diagnosis Date    Asthma     Mediastinal mass      Past Surgical History:   Procedure Laterality Date    APPENDECTOMY  2000    HERNIA REPAIR  2008    TONSILLECTOMY  2002     Social History     Socioeconomic History    Marital status:      Spouse name: Not on file    Number of children: Not on file    Years of education: Not on file    Highest education level: Not on file   Occupational History    Not on file   Tobacco Use    Smoking status: Never Smoker    Smokeless tobacco: Never Used   Vaping Use    Vaping Use: Never used   Substance and Sexual Activity    Alcohol use: No    Drug use: No    Sexual activity: Not on file   Other Topics Concern    Not on file   Social History Narrative    Not on file     Social Determinants of Health     Financial Resource Strain:     Difficulty of Paying Living Expenses:    Food Insecurity:     Worried About Running Out of Food in the Last Year:     [address] in the Last Year:    Transportation Needs:     Lack of Transportation (Medical):  Lack of Transportation (Non-Medical):    Physical Activity:     Days of Exercise per Week:     Minutes of Exercise per Session:    Stress:     Feeling of Stress :    Social Connections:     Frequency of Communication with Friends and Family:     Frequency of Social Gatherings with Friends and Family:     Attends Hinduism Services:     Active Member of Clubs or Organizations:     Attends Club or Organization Meetings:     Marital Status:    Intimate Partner Violence:     Fear of Current or Ex-Partner:     Emotionally Abused:     Physically Abused:     Sexually Abused:      Family History   Problem Relation Age of Onset    Heart Disease Mother     Stroke Mother     Diabetes Other     Cancer Father      Physical Exam:   /65   Pulse 71   Temp 97.6 °F (36.4 °C) (Tympanic)   Resp 17   Ht 6' 1\" (1.854 m)   Wt 190 lb (86.2 kg)   BMI 25.07 kg/m²    Body mass index is 25.07 kg/m². Physical Exam   Nursing note and vitals reviewed. Constitutional: Oriented to person, place, and time. Appears well-developed and well-nourished. No distress. Head: Normocephalic and atraumatic. Eyes: Conjunctivae and EOM are normal.   Pulmonary/Chest: Effort normal. No respiratory distress.    Neurological:  Patient has weakness on the right side of the face over the facial nerve starting from the zygomatic branch to the mandibular branch. Psychiatric: Normal mood and affect. Behavior is normal      Post Debridement Measurements:  Wound/Ulcer Descriptions are Pre Debridement except measurements:    Wound 09/30/21 Ear Right; Lower; Posterior #1 CLUSTER (Active)   Wound Image   09/30/21 1111   Wound Etiology Non-Healing Surgical 10/11/21 0846   Dressing Status New drainage noted; Old drainage noted 10/11/21 0846   Wound Cleansed Cleansed with saline 10/11/21 0846   Wound Length (cm) 4 cm 10/11/21 0846   Wound Width (cm) 0.2 cm 10/11/21 0846   Wound Depth (cm) 0.1 cm 10/11/21 0846   Wound Surface Area (cm^2) 0.8 cm^2 10/11/21 0846   Change in Wound Size % (l*w) 54.55 10/11/21 0846   Wound Volume (cm^3) 0.08 cm^3 10/11/21 0846   Wound Healing % 92 10/11/21 0846   Post-Procedure Length (cm) 4 cm 10/11/21 0846   Post-Procedure Width (cm) 0.2 cm 10/11/21 0846   Post-Procedure Depth (cm) 0.1 cm 10/11/21 0846   Post-Procedure Surface Area (cm^2) 0.8 cm^2 10/11/21 0846   Post-Procedure Volume (cm^3) 0.08 cm^3 10/11/21 0846   Wound Assessment Pink/red;Slough 10/11/21 0846   Drainage Amount Moderate 10/11/21 0846   Drainage Description Serosanguinous 10/11/21 0846   Odor None 10/11/21 0846   Maria Del Carmen-wound Assessment Intact 10/11/21 0846   Margins Defined edges 10/11/21 0846   Wound Thickness Description not for Pressure Injury Full thickness 10/11/21 0846   Number of days: 10       Wound 09/30/21 Ear Left; Lower; Posterior #2  (Active)   Wound Image   09/30/21 1111   Wound Etiology Non-Healing Surgical 10/11/21 0846   Dressing Status New drainage noted; Old drainage noted 10/11/21 0846   Wound Cleansed Cleansed with saline 10/11/21 0846   Wound Length (cm) 3.1 cm 10/11/21 0846   Wound Width (cm) 0.6 cm 10/11/21 0846   Wound Depth (cm) 0.1 cm 10/11/21 0846   Wound Surface Area (cm^2) 1.86 cm^2 10/11/21 0846   Change in Wound Size % (l*w) 40.95 10/11/21 0846   Wound Volume (cm^3) 0.186 cm^3 10/11/21 7473 Wound Healing % 85 10/11/21 0846   Post-Procedure Length (cm) 3.1 cm 10/11/21 0846   Post-Procedure Width (cm) 0.6 cm 10/11/21 0846   Post-Procedure Depth (cm) 0.1 cm 10/11/21 0846   Post-Procedure Surface Area (cm^2) 1.86 cm^2 10/11/21 0846   Post-Procedure Volume (cm^3) 0.186 cm^3 10/11/21 0846   Undermining Starts ___ O'Clock 7 10/04/21 1000   Undermining Ends___ O'Clock 9 10/04/21 1000   Undermining Maxium Distance (cm) 0.8 @7 10/04/21 1000   Wound Assessment Pink/red 10/11/21 0846   Drainage Amount Moderate 10/11/21 0846   Drainage Description Serosanguinous 10/11/21 0846   Odor None 10/11/21 0846   Maria Del Carmen-wound Assessment Intact 10/11/21 0846   Margins Defined edges 10/11/21 0846   Wound Thickness Description not for Pressure Injury Full thickness 10/11/21 0846   Number of days: 10                Imaging:   [unfilled]        Impression/Plan:     Problem List Items Addressed This Visit     Surgical wound, non healing, subsequent encounter - Primary    Relevant Orders    Initiate Outpatient Wound Care Protocol    Ear wound    Relevant Orders    Initiate Outpatient Wound Care Protocol          Patient Active Problem List   Diagnosis    Asthma    Mediastinal mass    Dyslipidemia    Uncomplicated asthma    Neck pain    Neck muscle spasm    Surgical wound, non healing, subsequent encounter    Ear wound     Plan:  Patient has an appointment with a neurologist this week. .  We will place Lizzie and Gentac. Patient is to follow-up in 1 week. I recommended he should ask his operating surgeon regarding his lifting restrictions. I will give him 1 more week off of work.            Electronically signed by:  Deshaun Benitez MD 10/11/2021

## 2021-10-18 ENCOUNTER — HOSPITAL ENCOUNTER (OUTPATIENT)
Dept: WOUND CARE | Age: 51
Discharge: HOME | End: 2021-10-18
Payer: COMMERCIAL

## 2021-10-18 VITALS
SYSTOLIC BLOOD PRESSURE: 146 MMHG | DIASTOLIC BLOOD PRESSURE: 83 MMHG | HEART RATE: 87 BPM | HEIGHT: 73 IN | BODY MASS INDEX: 25.18 KG/M2 | TEMPERATURE: 98.2 F | RESPIRATION RATE: 18 BRPM | WEIGHT: 190 LBS

## 2021-10-18 DIAGNOSIS — S01.302D OPEN WOUND OF LEFT EAR, UNSPECIFIED OPEN WOUND TYPE, SUBSEQUENT ENCOUNTER: ICD-10-CM

## 2021-10-18 DIAGNOSIS — T81.89XD SURGICAL WOUND, NON HEALING, SUBSEQUENT ENCOUNTER: Primary | ICD-10-CM

## 2021-10-18 PROCEDURE — 11042 DBRDMT SUBQ TIS 1ST 20SQCM/<: CPT

## 2021-10-18 PROCEDURE — 11042 DBRDMT SUBQ TIS 1ST 20SQCM/<: CPT | Performed by: PLASTIC SURGERY

## 2021-10-18 RX ORDER — DIAZEPAM 5 MG/1
5 TABLET ORAL EVERY 6 HOURS PRN
COMMUNITY

## 2021-10-18 RX ORDER — LIDOCAINE HYDROCHLORIDE 40 MG/ML
SOLUTION TOPICAL ONCE
Status: CANCELLED | OUTPATIENT
Start: 2021-10-18 | End: 2021-10-18

## 2021-10-18 RX ORDER — MELOXICAM 15 MG/1
15 TABLET ORAL DAILY
COMMUNITY

## 2021-10-18 RX ORDER — LIDOCAINE HYDROCHLORIDE 20 MG/ML
JELLY TOPICAL ONCE
Status: COMPLETED | OUTPATIENT
Start: 2021-10-18 | End: 2021-10-18

## 2021-10-18 RX ORDER — MELOXICAM 7.5 MG/1
7.5 TABLET ORAL DAILY
COMMUNITY

## 2021-10-18 RX ORDER — LIDOCAINE HYDROCHLORIDE 20 MG/ML
JELLY TOPICAL ONCE
Status: CANCELLED | OUTPATIENT
Start: 2021-10-18 | End: 2021-10-18

## 2021-10-18 RX ORDER — CYCLOBENZAPRINE HCL 5 MG
5 TABLET ORAL 3 TIMES DAILY
COMMUNITY

## 2021-10-18 RX ADMIN — LIDOCAINE HYDROCHLORIDE 6 ML: 20 JELLY TOPICAL at 08:35

## 2021-10-18 NOTE — PROGRESS NOTES
[address]       Progress Note and Procedure Note      Progress note     Chief Complaint   Patient presents with    Wound Check     bilateral ears        HPI:   Jovany Knapp is a 45 y.o. male who presents status post facelift. Patient has weakness on the right face. Patient also has open wounds postauricular and inferior auricular. Patient is here to discuss all the issues. Patient has burning associated with it. The pain worsens with debridement. Medications:     Current Outpatient Medications   Medication Sig Dispense Refill    diazePAM (VALIUM) 5 MG tablet Take 5 mg by mouth every 6 hours as needed.  meloxicam (MOBIC) 15 MG tablet Take 15 mg by mouth daily      cyclobenzaprine (FLEXERIL) 5 MG tablet Take 5 mg by mouth 3 times daily      meloxicam (MOBIC) 7.5 MG tablet Take 7.5 mg by mouth daily      metoprolol succinate (TOPROL XL) 50 MG extended release tablet Take 50 mg by mouth daily      tiZANidine (ZANAFLEX) 4 MG tablet Take 4 mg by mouth 2 times daily as needed       No current facility-administered medications for this encounter. Allergies: Allergies   Allergen Reactions    Cat Hair Extract     Dust Mite Extract      Review of Systems:   Constitutional: Negative for fever, chills, fatigue and unexpected weight change. HENT: Negative for hearing loss, sore throat and facial swelling. Eyes: Negative for pain and discharge. Respiratory: Patient has a history of asthma. Cardiovascular: Negative for chest pain. Gastrointestinal: Negative for nausea, vomiting, diarrhea and constipation. Skin: Negative for pallor and rash. Neurological: Negative for seizures, syncope and numbness. Hematological: Does not bruise/bleed easily. Psychiatric/Behavioral: Negative for behavioral problems. The patient is not nervous/anxious.       Past Medical History:   Diagnosis Date    Asthma     Mediastinal mass      Past Surgical History:   Procedure Laterality Date    APPENDECTOMY  2000    HERNIA REPAIR  2008    TONSILLECTOMY  2002     Social History     Socioeconomic History    Marital status:      Spouse name: Not on file    Number of children: Not on file    Years of education: Not on file    Highest education level: Not on file   Occupational History    Not on file   Tobacco Use    Smoking status: Never Smoker    Smokeless tobacco: Never Used   Vaping Use    Vaping Use: Never used   Substance and Sexual Activity    Alcohol use: No    Drug use: No    Sexual activity: Not on file   Other Topics Concern    Not on file   Social History Narrative    Not on file     Social Determinants of Health     Financial Resource Strain:     Difficulty of Paying Living Expenses:    Food Insecurity:     Worried About Running Out of Food in the Last Year:     Rachel of Food in the Last Year:    Transportation Needs:     Lack of Transportation (Medical):  Lack of Transportation (Non-Medical):    Physical Activity:     Days of Exercise per Week:     Minutes of Exercise per Session:    Stress:     Feeling of Stress :    Social Connections:     Frequency of Communication with Friends and Family:     Frequency of Social Gatherings with Friends and Family:     Attends Worship Services:     Active Member of Clubs or Organizations:     Attends Club or Organization Meetings:     Marital Status:    Intimate Partner Violence:     Fear of Current or Ex-Partner:     Emotionally Abused:     Physically Abused:     Sexually Abused:      Family History   Problem Relation Age of Onset    Heart Disease Mother     Stroke Mother     Diabetes Other     Cancer Father      Physical Exam:   BP (!) 146/83   Pulse 87   Temp 98.2 °F (36.8 °C) (Tympanic)   Resp 18   Ht 6' 1\" (1.854 m)   Wt 190 lb (86.2 kg)   BMI 25.07 kg/m²    Body mass index is 25.07 kg/m². Physical Exam   Nursing note and vitals reviewed.   Constitutional: Oriented to person, place, and time. Appears well-developed and well-nourished. No distress. Head: Normocephalic and atraumatic. Eyes: Conjunctivae and EOM are normal.   Pulmonary/Chest: Effort normal. No respiratory distress. Neurological:  Patient has weakness on the right side of the face over the facial nerve starting from the zygomatic branch to the mandibular branch. Psychiatric: Normal mood and affect. Behavior is normal      Post Debridement Measurements:  Wound/Ulcer Descriptions are Pre Debridement except measurements:    Wound 09/30/21 Ear Right; Lower; Posterior #1 CLUSTER (Active)   Wound Image   09/30/21 1111   Wound Etiology Non-Healing Surgical 10/18/21 2323   Dressing Status New drainage noted; Old drainage noted 10/18/21 0833   Wound Cleansed Cleansed with saline 10/18/21 0833   Wound Length (cm) 1.8 cm 10/18/21 0833   Wound Width (cm) 0.2 cm 10/18/21 0833   Wound Depth (cm) 0.1 cm 10/18/21 0833   Wound Surface Area (cm^2) 0.36 cm^2 10/18/21 0833   Change in Wound Size % (l*w) 79.55 10/18/21 0833   Wound Volume (cm^3) 0.036 cm^3 10/18/21 0833   Wound Healing % 97 10/18/21 0833   Post-Procedure Length (cm) 1.8 cm 10/18/21 0833   Post-Procedure Width (cm) 0.2 cm 10/18/21 0833   Post-Procedure Depth (cm) 0.1 cm 10/18/21 0833   Post-Procedure Surface Area (cm^2) 0.36 cm^2 10/18/21 0833   Post-Procedure Volume (cm^3) 0.036 cm^3 10/18/21 0833   Wound Assessment Pink/red;Slough 10/18/21 0833   Drainage Amount Moderate 10/18/21 0833   Drainage Description Serosanguinous 10/18/21 0833   Odor None 10/18/21 0833   Maria Del Carmen-wound Assessment Intact 10/18/21 0833   Margins Defined edges 10/18/21 0833   Wound Thickness Description not for Pressure Injury Full thickness 10/18/21 0833   Number of days: 17       Wound 09/30/21 Ear Left; Lower; Posterior #2  (Active)   Wound Image   09/30/21 1111   Wound Etiology Non-Healing Surgical 10/18/21 9908   Dressing Status New drainage noted; Old drainage noted 10/18/21 0833   Wound Cleansed Cleansed with saline 10/18/21 0833   Wound Length (cm) 1.3 cm 10/18/21 0833   Wound Width (cm) 1.2 cm 10/18/21 0833   Wound Depth (cm) 0.3 cm 10/18/21 0833   Wound Surface Area (cm^2) 1.56 cm^2 10/18/21 0833   Change in Wound Size % (l*w) 50.48 10/18/21 0833   Wound Volume (cm^3) 0.468 cm^3 10/18/21 0833   Wound Healing % 63 10/18/21 0833   Post-Procedure Length (cm) 1.3 cm 10/18/21 0833   Post-Procedure Width (cm) 1.2 cm 10/18/21 0833   Post-Procedure Depth (cm) 0.3 cm 10/18/21 0833   Post-Procedure Surface Area (cm^2) 1.56 cm^2 10/18/21 0833   Post-Procedure Volume (cm^3) 0.468 cm^3 10/18/21 0833   Undermining Starts ___ O'Clock 7 10/04/21 1000   Undermining Ends___ O'Clock 9 10/04/21 1000   Undermining Maxium Distance (cm) 0.8 @7 10/04/21 1000   Wound Assessment Pink/red;Slough 10/18/21 0833   Drainage Amount Moderate 10/18/21 0833   Drainage Description Serosanguinous 10/18/21 0833   Odor None 10/18/21 0833   Maria Del Carmen-wound Assessment Intact 10/18/21 0833   Margins Defined edges 10/18/21 0833   Wound Thickness Description not for Pressure Injury Full thickness 10/18/21 1244   Number of days: 17            Procedure Note  Indications:  Based on my examination of this patient's wound(s)/ulcer(s) today, debridement is required to promote healing and evaluate the wound base. Performed by: Errol Ravi MD    Consent obtained:  Yes    Time out taken:  Yes    Pain Control: Anesthetic  Anesthetic: 2% Lidocaine Gel Topical       Debridement:Excisional Debridement    Using curette the wound(s)/ulcer(s) was/were sharply debrided down through and including the removal of subcutaneous tissue.         Devitalized Tissue Debrided:  slough    Pre Debridement Measurements:  Are located in the Wound/Ulcer Documentation Flow Sheet    Wound/Ulcer #: 1           Percent of Wound(s)/Ulcer(s) Debrided: 100%    Total Surface Area Debrided:  0.36 sq cm of the left posterior ear to the subcutaneous tissue      Diabetic/Pressure/Non Pressure Ulcers only:  Ulcer: Non-Pressure ulcer, fat layer exposed      Estimated Blood Loss:  Minimal    Hemostasis Achieved:  by pressure    Procedural Pain:  0  / 10     Post Procedural Pain:  0 / 10     Response to treatment:  Well tolerated by patient. Imaging:   [unfilled]        Impression/Plan:     Problem List Items Addressed This Visit     Surgical wound, non healing, subsequent encounter - Primary    Relevant Orders    Initiate Outpatient Wound Care Protocol    Ear wound    Relevant Orders    Initiate Outpatient Wound Care Protocol          Patient Active Problem List   Diagnosis    Asthma    Mediastinal mass    Dyslipidemia    Uncomplicated asthma    Neck pain    Neck muscle spasm    Surgical wound, non healing, subsequent encounter    Ear wound     Plan:    Patient was evaluated by a neurologist.  The neurologist recommended to give additional time. We will place Lizzie and Gentac. Patient is to follow-up in 1 week.       Letter was given to the patient to return to work on 11 /1/21         Electronically signed by:  Chata Starr MD 10/18/2021

## 2021-10-18 NOTE — LETTER
WORK RELEASE  [facility]  Koby[address]evan Miramontes [address] [facility]  [phone]  Dept: [address] RECORD NUMBER: [id number]   AGE: 45 y.o.  GENDER: male : 1970   TODAYS DATE: 10/18/2021       Mr. Augusto Alvarez was seen in the [address] [facility] on 10/18/2021     Patient is to stay off of work until reevaluated on 2021      Sincerely,      Electronically signed by Joseph Newman MD on 10/18/2021 at 8:54 AM    [address] [place] [address] Pkwy and [facility] Therapy

## 2021-10-25 ENCOUNTER — HOSPITAL ENCOUNTER (OUTPATIENT)
Dept: WOUND CARE | Age: 51
Discharge: HOME | End: 2021-10-25
Payer: COMMERCIAL

## 2021-10-25 VITALS
TEMPERATURE: 97.7 F | HEART RATE: 71 BPM | BODY MASS INDEX: 25.18 KG/M2 | WEIGHT: 190 LBS | RESPIRATION RATE: 16 BRPM | HEIGHT: 73 IN | DIASTOLIC BLOOD PRESSURE: 83 MMHG | SYSTOLIC BLOOD PRESSURE: 136 MMHG

## 2021-10-25 PROCEDURE — 11042 DBRDMT SUBQ TIS 1ST 20SQCM/<: CPT

## 2021-10-25 PROCEDURE — 11042 DBRDMT SUBQ TIS 1ST 20SQCM/<: CPT | Performed by: PLASTIC SURGERY

## 2021-10-25 NOTE — PROGRESS NOTES
[address]       Progress Note and Procedure Note      Progress note     Chief Complaint   Patient presents with    Wound Check     wound        HPI:   Gordon Catalan is a 45 y.o. male who presents status post facelift. Patient has weakness on the right face. Patient also has open wounds postauricular and inferior auricular. Patient is here to discuss all the issues. Patient has burning associated with it. The pain worsens with debridement. Medications:     Current Outpatient Medications   Medication Sig Dispense Refill    diazePAM (VALIUM) 5 MG tablet Take 5 mg by mouth every 6 hours as needed.  meloxicam (MOBIC) 15 MG tablet Take 15 mg by mouth daily      cyclobenzaprine (FLEXERIL) 5 MG tablet Take 5 mg by mouth 3 times daily      meloxicam (MOBIC) 7.5 MG tablet Take 7.5 mg by mouth daily      metoprolol succinate (TOPROL XL) 50 MG extended release tablet Take 50 mg by mouth daily      tiZANidine (ZANAFLEX) 4 MG tablet Take 4 mg by mouth 2 times daily as needed       No current facility-administered medications for this encounter. Allergies: Allergies   Allergen Reactions    Cat Hair Extract     Dust Mite Extract      Review of Systems:   Constitutional: Negative for fever, chills, fatigue and unexpected weight change. HENT: Negative for hearing loss, sore throat and facial swelling. Eyes: Negative for pain and discharge. Respiratory: Patient has a history of asthma. Cardiovascular: Negative for chest pain. Gastrointestinal: Negative for nausea, vomiting, diarrhea and constipation. Skin: Negative for pallor and rash. Neurological: Negative for seizures, syncope and numbness. Hematological: Does not bruise/bleed easily. Psychiatric/Behavioral: Negative for behavioral problems. The patient is not nervous/anxious.       Past Medical History:   Diagnosis Date    Asthma     Mediastinal mass      Past Surgical History:   Procedure Laterality Date    APPENDECTOMY  2000    HERNIA REPAIR  2008    TONSILLECTOMY  2002     Social History     Socioeconomic History    Marital status:      Spouse name: Not on file    Number of children: Not on file    Years of education: Not on file    Highest education level: Not on file   Occupational History    Not on file   Tobacco Use    Smoking status: Never Smoker    Smokeless tobacco: Never Used   Vaping Use    Vaping Use: Never used   Substance and Sexual Activity    Alcohol use: No    Drug use: No    Sexual activity: Not on file   Other Topics Concern    Not on file   Social History Narrative    Not on file     Social Determinants of Health     Financial Resource Strain:     Difficulty of Paying Living Expenses:    Food Insecurity:     Worried About Running Out of Food in the Last Year:     Rachel of Food in the Last Year:    Transportation Needs:     Lack of Transportation (Medical):  Lack of Transportation (Non-Medical):    Physical Activity:     Days of Exercise per Week:     Minutes of Exercise per Session:    Stress:     Feeling of Stress :    Social Connections:     Frequency of Communication with Friends and Family:     Frequency of Social Gatherings with Friends and Family:     Attends Judaism Services:     Active Member of Clubs or Organizations:     Attends Club or Organization Meetings:     Marital Status:    Intimate Partner Violence:     Fear of Current or Ex-Partner:     Emotionally Abused:     Physically Abused:     Sexually Abused:      Family History   Problem Relation Age of Onset    Heart Disease Mother     Stroke Mother     Diabetes Other     Cancer Father      Physical Exam:   /83   Pulse 71   Temp 97.7 °F (36.5 °C) (Tympanic)   Resp 16   Ht 6' 1\" (1.854 m)   Wt 190 lb (86.2 kg)   BMI 25.07 kg/m²    Body mass index is 25.07 kg/m². Physical Exam   Nursing note and vitals reviewed.   Constitutional: Oriented to person, place, and time. Appears well-developed and well-nourished. No distress. Head: Normocephalic and atraumatic. Eyes: Conjunctivae and EOM are normal.   Pulmonary/Chest: Effort normal. No respiratory distress. Neurological:  Patient has weakness on the right side of the face over the facial nerve starting from the zygomatic branch to the mandibular branch. Psychiatric: Normal mood and affect. Behavior is normal      Post Debridement Measurements:  Wound/Ulcer Descriptions are Pre Debridement except measurements:    Wound 09/30/21 Ear Right; Lower; Posterior #1 CLUSTER (Active)   Wound Image   09/30/21 1111   Wound Etiology Non-Healing Surgical 10/18/21 4588   Dressing Status New drainage noted; Old drainage noted 10/18/21 0833   Wound Cleansed Cleansed with saline 10/18/21 0833   Wound Length (cm) 0 cm 10/25/21 0854   Wound Width (cm) 0 cm 10/25/21 0854   Wound Depth (cm) 0 cm 10/25/21 0854   Wound Surface Area (cm^2) 0 cm^2 10/25/21 0854   Change in Wound Size % (l*w) 100 10/25/21 0854   Wound Volume (cm^3) 0 cm^3 10/25/21 0854   Wound Healing % 100 10/25/21 0854   Post-Procedure Length (cm) 0 cm 10/25/21 0859   Post-Procedure Width (cm) 0 cm 10/25/21 0859   Post-Procedure Depth (cm) 0 cm 10/25/21 0859   Post-Procedure Surface Area (cm^2) 0 cm^2 10/25/21 0859   Post-Procedure Volume (cm^3) 0 cm^3 10/25/21 0859   Wound Assessment Pink/red;Slough 10/18/21 0833   Drainage Amount Moderate 10/18/21 0833   Drainage Description Serosanguinous 10/18/21 0833   Odor None 10/18/21 0833   Maria Del Carmen-wound Assessment Intact 10/18/21 0833   Margins Defined edges 10/18/21 0833   Wound Thickness Description not for Pressure Injury Full thickness 10/18/21 0833   Number of days: 24       Wound 09/30/21 Ear Left; Lower; Posterior #2 Left ear cluster (Active)   Wound Image   09/30/21 1111   Wound Etiology Non-Healing Surgical 10/25/21 0859   Dressing Status New drainage noted; Old drainage noted 10/25/21 3873   Wound Cleansed Cleansed with saline 10/25/21 0859   Wound Length (cm) 3.5 cm 10/25/21 0859   Wound Width (cm) 1.5 cm 10/25/21 0859   Wound Depth (cm) 0.2 cm 10/25/21 0859   Wound Surface Area (cm^2) 5.25 cm^2 10/25/21 0859   Change in Wound Size % (l*w) -66.67 10/25/21 0859   Wound Volume (cm^3) 1.05 cm^3 10/25/21 0859   Wound Healing % 17 10/25/21 0859   Post-Procedure Length (cm) 3.5 cm 10/25/21 0859   Post-Procedure Width (cm) 1.5 cm 10/25/21 0859   Post-Procedure Depth (cm) 0.2 cm 10/25/21 0859   Post-Procedure Surface Area (cm^2) 5.25 cm^2 10/25/21 0859   Post-Procedure Volume (cm^3) 1.05 cm^3 10/25/21 0859   Undermining Starts ___ O'Clock 7 10/04/21 1000   Undermining Ends___ O'Clock 9 10/04/21 1000   Undermining Maxium Distance (cm) 0.8 @7 10/04/21 1000   Wound Assessment Pink/red;Slough 10/25/21 0859   Drainage Amount Moderate 10/25/21 0859   Drainage Description Serosanguinous 10/25/21 0859   Odor None 10/25/21 0859   Maria Del Carmen-wound Assessment Intact 10/25/21 0859   Margins Defined edges 10/25/21 0859   Wound Thickness Description not for Pressure Injury Full thickness 10/25/21 0859   Number of days: 24            Procedure Note  Indications:  Based on my examination of this patient's wound(s)/ulcer(s) today, debridement is required to promote healing and evaluate the wound base. Performed by: Joseph Newman MD    Consent obtained:  Yes    Time out taken:  Yes    Pain Control:         Debridement:Excisional Debridement    Using curette the wound(s)/ulcer(s) was/were sharply debrided down through and including the removal of subcutaneous tissue.         Devitalized Tissue Debrided:  slough    Pre Debridement Measurements:  Are located in the Wound/Ulcer Documentation Flow Sheet    Wound/Ulcer #: 1           Percent of Wound(s)/Ulcer(s) Debrided: 100%    Total Surface Area Debrided:  0.2 sq cm of the left posterior ear to the subcutaneous tissue      Diabetic/Pressure/Non Pressure Ulcers only:  Ulcer: Non-Pressure ulcer, fat layer exposed      Estimated Blood Loss:  Minimal    Hemostasis Achieved:  by pressure    Procedural Pain:  0  / 10     Post Procedural Pain:  0 / 10     Response to treatment:  Well tolerated by patient. Imaging:   [unfilled]        Impression/Plan:     Problem List Items Addressed This Visit     None          Patient Active Problem List   Diagnosis    Asthma    Mediastinal mass    Dyslipidemia    Uncomplicated asthma    Neck pain    Neck muscle spasm    Surgical wound, non healing, subsequent encounter    Ear wound     Plan:    Patient was evaluated by a neurologist.  The neurologist recommended to give additional time. We will place Lizzie and Gent[place]. Patient is to follow-up in 2 week.       Letter was given to the patient to return to work on 11 /1/21         Electronically signed by:  Artemio Cramer MD 10/25/2021

## 2021-11-08 ENCOUNTER — HOSPITAL ENCOUNTER (OUTPATIENT)
Age: 51
Discharge: HOME | End: 2021-11-08
Payer: COMMERCIAL

## 2021-11-08 ENCOUNTER — HOSPITAL ENCOUNTER (OUTPATIENT)
Dept: WOUND CARE | Age: 51
Discharge: HOME | End: 2021-11-08
Payer: COMMERCIAL

## 2021-11-08 VITALS
HEIGHT: 73 IN | RESPIRATION RATE: 18 BRPM | BODY MASS INDEX: 25.18 KG/M2 | WEIGHT: 190 LBS | HEART RATE: 78 BPM | SYSTOLIC BLOOD PRESSURE: 131 MMHG | TEMPERATURE: 97.6 F | DIASTOLIC BLOOD PRESSURE: 76 MMHG

## 2021-11-08 DIAGNOSIS — T81.89XD SURGICAL WOUND, NON HEALING, SUBSEQUENT ENCOUNTER: Primary | ICD-10-CM

## 2021-11-08 DIAGNOSIS — S01.302D OPEN WOUND OF LEFT EAR, UNSPECIFIED OPEN WOUND TYPE, SUBSEQUENT ENCOUNTER: ICD-10-CM

## 2021-11-08 LAB
ABSOLUTE EOS #: 0.27 K/UL (ref 0–0.44)
ABSOLUTE IMMATURE GRANULOCYTE: <0.03 K/UL (ref 0–0.3)
ABSOLUTE LYMPH #: 1.27 K/UL (ref 1.1–3.7)
ABSOLUTE MONO #: 0.47 K/UL (ref 0.1–1.2)
ALBUMIN SERPL-MCNC: 4 G/DL (ref 3.5–5.2)
ALBUMIN/GLOBULIN RATIO: 1.4 (ref 1–2.5)
ALP BLD-CCNC: 70 U/L (ref 40–129)
ALT SERPL-CCNC: 13 U/L (ref 5–41)
ANION GAP SERPL CALCULATED.3IONS-SCNC: 9 MMOL/L (ref 9–17)
AST SERPL-CCNC: 18 U/L
BASOPHILS # BLD: 0 % (ref 0–2)
BASOPHILS ABSOLUTE: <0.03 K/UL (ref 0–0.2)
BILIRUB SERPL-MCNC: 0.7 MG/DL (ref 0.3–1.2)
BILIRUBIN URINE: NEGATIVE
BUN BLDV-MCNC: 10 MG/DL (ref 6–20)
BUN/CREAT BLD: ABNORMAL (ref 9–20)
CALCIUM SERPL-MCNC: 8.7 MG/DL (ref 8.6–10.4)
CHLORIDE BLD-SCNC: 104 MMOL/L (ref 98–107)
CHOLESTEROL, FASTING: 220 MG/DL
CHOLESTEROL/HDL RATIO: 3.3
CO2: 24 MMOL/L (ref 20–31)
COLOR: YELLOW
COMMENT UA: ABNORMAL
CORTISOL COLLECTION INFO: NORMAL
CORTISOL: 15.4 UG/DL (ref 2.7–18.4)
CREAT SERPL-MCNC: 0.64 MG/DL (ref 0.7–1.2)
DIFFERENTIAL TYPE: ABNORMAL
EOSINOPHILS RELATIVE PERCENT: 4 % (ref 1–4)
GFR AFRICAN AMERICAN: >60 ML/MIN
GFR NON-AFRICAN AMERICAN: >60 ML/MIN
GFR SERPL CREATININE-BSD FRML MDRD: ABNORMAL ML/MIN/{1.73_M2}
GFR SERPL CREATININE-BSD FRML MDRD: ABNORMAL ML/MIN/{1.73_M2}
GLUCOSE BLD-MCNC: 91 MG/DL (ref 70–99)
GLUCOSE URINE: NEGATIVE
HCT VFR BLD CALC: 42.2 % (ref 40.7–50.3)
HDLC SERPL-MCNC: 67 MG/DL
HEMOGLOBIN: 13.1 G/DL (ref 13–17)
IMMATURE GRANULOCYTES: 0 %
KETONES, URINE: NEGATIVE
LDL CHOLESTEROL: 139 MG/DL (ref 0–130)
LEUKOCYTE ESTERASE, URINE: NEGATIVE
LYMPHOCYTES # BLD: 18 % (ref 24–43)
MCH RBC QN AUTO: 26.6 PG (ref 25.2–33.5)
MCHC RBC AUTO-ENTMCNC: 31 G/DL (ref 28.4–34.8)
MCV RBC AUTO: 85.6 FL (ref 82.6–102.9)
MONOCYTES # BLD: 7 % (ref 3–12)
NITRITE, URINE: NEGATIVE
NRBC AUTOMATED: 0 PER 100 WBC
PDW BLD-RTO: 13.9 % (ref 11.8–14.4)
PH UA: 8.5 (ref 5–8)
PLATELET # BLD: 195 K/UL (ref 138–453)
PLATELET ESTIMATE: ABNORMAL
PMV BLD AUTO: 11.3 FL (ref 8.1–13.5)
POTASSIUM SERPL-SCNC: 4.2 MMOL/L (ref 3.7–5.3)
PROSTATE SPECIFIC ANTIGEN: 0.6 UG/L
PROTEIN UA: NEGATIVE
RBC # BLD: 4.93 M/UL (ref 4.21–5.77)
RBC # BLD: ABNORMAL 10*6/UL
SEG NEUTROPHILS: 71 % (ref 36–65)
SEGMENTED NEUTROPHILS ABSOLUTE COUNT: 4.94 K/UL (ref 1.5–8.1)
SODIUM BLD-SCNC: 137 MMOL/L (ref 135–144)
SPECIFIC GRAVITY UA: 1.01 (ref 1–1.03)
TESTOSTERONE TOTAL: 648 NG/DL (ref 220–1000)
THYROXINE, FREE: 1.24 NG/DL (ref 0.93–1.7)
TOTAL PROTEIN: 6.9 G/DL (ref 6.4–8.3)
TRIGLYCERIDE, FASTING: 70 MG/DL
TSH SERPL DL<=0.05 MIU/L-ACNC: 0.89 MIU/L (ref 0.3–5)
TURBIDITY: CLEAR
URINE HGB: NEGATIVE
UROBILINOGEN, URINE: NORMAL
VLDLC SERPL CALC-MCNC: ABNORMAL MG/DL (ref 1–30)
WBC # BLD: 7 K/UL (ref 3.5–11.3)
WBC # BLD: ABNORMAL 10*3/UL

## 2021-11-08 PROCEDURE — G0103 PSA SCREENING: HCPCS

## 2021-11-08 PROCEDURE — 80061 LIPID PANEL: CPT

## 2021-11-08 PROCEDURE — 84443 ASSAY THYROID STIM HORMONE: CPT

## 2021-11-08 PROCEDURE — 82533 TOTAL CORTISOL: CPT

## 2021-11-08 PROCEDURE — 80053 COMPREHEN METABOLIC PANEL: CPT

## 2021-11-08 PROCEDURE — 81003 URINALYSIS AUTO W/O SCOPE: CPT

## 2021-11-08 PROCEDURE — 84439 ASSAY OF FREE THYROXINE: CPT

## 2021-11-08 PROCEDURE — 99213 OFFICE O/P EST LOW 20 MIN: CPT

## 2021-11-08 PROCEDURE — 99213 OFFICE O/P EST LOW 20 MIN: CPT | Performed by: PLASTIC SURGERY

## 2021-11-08 PROCEDURE — 36415 COLL VENOUS BLD VENIPUNCTURE: CPT

## 2021-11-08 PROCEDURE — 85025 COMPLETE CBC W/AUTO DIFF WBC: CPT

## 2021-11-08 PROCEDURE — 84403 ASSAY OF TOTAL TESTOSTERONE: CPT

## 2021-11-08 RX ORDER — LIDOCAINE HYDROCHLORIDE 20 MG/ML
JELLY TOPICAL ONCE
Status: COMPLETED | OUTPATIENT
Start: 2021-11-08 | End: 2021-11-08

## 2021-11-08 RX ORDER — LIDOCAINE HYDROCHLORIDE 40 MG/ML
SOLUTION TOPICAL ONCE
Status: CANCELLED | OUTPATIENT
Start: 2021-11-08 | End: 2021-11-08

## 2021-11-08 RX ORDER — LIDOCAINE HYDROCHLORIDE 20 MG/ML
JELLY TOPICAL ONCE
Status: CANCELLED | OUTPATIENT
Start: 2021-11-08 | End: 2021-11-08

## 2021-11-08 RX ADMIN — LIDOCAINE HYDROCHLORIDE 6 ML: 20 JELLY TOPICAL at 08:31

## 2021-11-08 NOTE — PROGRESS NOTES
[address]   Progress Note and Procedure Note      [address] [facility] RECORD NUMBER:  [id number]  AGE: 45 y.o. GENDER: male  : 1970  EPISODE DATE:  2021    Subjective:     Chief Complaint   Patient presents with    Wound Check     ears         HISTORY of PRESENT ILLNESS HPI     Lorenzo Galo is a 45 y.o. male who presents today for wound/ulcer evaluation. History of Wound Context: Surgical wound  Wound/Ulcer Pain Timing/Severity: none  Quality of pain: N/A  Severity:  0 / 10   Modifying Factors: None  Associated Signs/Symptoms: none    Ulcer Identification:  Ulcer Type: non-healing surgical  Contributing Factors: none    Wound: Postop surgical wound        PAST MEDICAL HISTORY        Diagnosis Date    Asthma     Mediastinal mass        PAST SURGICAL HISTORY    Past Surgical History:   Procedure Laterality Date    APPENDECTOMY  2000    HERNIA REPAIR  2008    TONSILLECTOMY  2002       FAMILY HISTORY    Family History   Problem Relation Age of Onset    Heart Disease Mother     Stroke Mother     Diabetes Other     Cancer Father        SOCIAL HISTORY    Social History     Tobacco Use    Smoking status: Never Smoker    Smokeless tobacco: Never Used   Vaping Use    Vaping Use: Never used   Substance Use Topics    Alcohol use: No    Drug use: No       ALLERGIES    Allergies   Allergen Reactions    Cat Hair Extract     Dust Mite Extract        MEDICATIONS    Current Outpatient Medications on File Prior to Encounter   Medication Sig Dispense Refill    diazePAM (VALIUM) 5 MG tablet Take 5 mg by mouth every 6 hours as needed.       meloxicam (MOBIC) 15 MG tablet Take 15 mg by mouth daily      cyclobenzaprine (FLEXERIL) 5 MG tablet Take 5 mg by mouth 3 times daily      meloxicam (MOBIC) 7.5 MG tablet Take 7.5 mg by mouth daily      metoprolol succinate (TOPROL XL) 50 MG extended release tablet Take 50 mg by mouth daily      tiZANidine (ZANAFLEX) 4 MG tablet Take 4 mg by mouth 2 times daily as needed       No current facility-administered medications on file prior to encounter. REVIEW OF SYSTEMS    Pertinent items are noted in HPI. Objective:      /76   Pulse 78   Temp 97.6 °F (36.4 °C)   Resp 18   Ht 6' 1\" (1.854 m)   Wt 190 lb (86.2 kg)   BMI 25.07 kg/m²     Wt Readings from Last 3 Encounters:   11/08/21 190 lb (86.2 kg)   10/25/21 190 lb (86.2 kg)   10/18/21 190 lb (86.2 kg)       PHYSICAL EXAM    Skin: warm and dry, no rash or erythema  Head: normocephalic and atraumatic  Eyes: Extraocular muscles are intact, conjunctive is anicteric      Assessment:      Problem List Items Addressed This Visit     Surgical wound, non healing, subsequent encounter - Primary    Relevant Orders    Initiate Outpatient Wound Care Protocol    Ear wound    Relevant Orders    Initiate Outpatient Wound Care Protocol           Wound 09/30/21 Ear Right; Lower; Posterior #1 CLUSTER (Active)   Wound Image   09/30/21 1111   Wound Etiology Non-Healing Surgical 10/18/21 2899   Dressing Status New drainage noted;  Old drainage noted 10/18/21 0833   Wound Cleansed Cleansed with saline 10/18/21 0833   Wound Length (cm) 0 cm 10/25/21 0854   Wound Width (cm) 0 cm 10/25/21 0854   Wound Depth (cm) 0 cm 10/25/21 0854   Wound Surface Area (cm^2) 0 cm^2 10/25/21 0854   Change in Wound Size % (l*w) 100 10/25/21 0854   Wound Volume (cm^3) 0 cm^3 10/25/21 0854   Wound Healing % 100 10/25/21 0854   Post-Procedure Length (cm) 0 cm 11/08/21 0841   Post-Procedure Width (cm) 0 cm 11/08/21 0841   Post-Procedure Depth (cm) 0 cm 11/08/21 0841   Post-Procedure Surface Area (cm^2) 0 cm^2 11/08/21 0841   Post-Procedure Volume (cm^3) 0 cm^3 11/08/21 0841   Wound Assessment [place]/red; [facility] 10/18/21 0833   Drainage Amount Moderate 10/18/21 0833   Drainage Description Serosanguinous 10/18/21 0833   Odor None 10/18/21 0833   Maria Del Carmen-wound Assessment Intact 10/18/21 0833   Margins Defined edges 10/18/21 8150   Wound Thickness Description not for Pressure Injury Full thickness 10/18/21 0833   Number of days: 38       Wound 09/30/21 Ear Left; Lower; Posterior #2 Left ear cluster (Active)   Wound Image   09/30/21 1111   Wound Etiology Non-Healing Surgical 11/08/21 0835   Dressing Status New drainage noted; Old drainage noted 11/08/21 0835   Wound Cleansed Cleansed with saline 11/08/21 0835   Wound Length (cm) 0 cm 11/08/21 0835   Wound Width (cm) 0 cm 11/08/21 0835   Wound Depth (cm) 0 cm 11/08/21 0835   Wound Surface Area (cm^2) 0 cm^2 11/08/21 0835   Change in Wound Size % (l*w) 100 11/08/21 0835   Wound Volume (cm^3) 0 cm^3 11/08/21 0835   Wound Healing % 100 11/08/21 0835   Post-Procedure Length (cm) 0 cm 11/08/21 0835   Post-Procedure Width (cm) 0 cm 11/08/21 0835   Post-Procedure Depth (cm) 0 cm 11/08/21 0835   Post-Procedure Surface Area (cm^2) 0 cm^2 11/08/21 0835   Post-Procedure Volume (cm^3) 0 cm^3 11/08/21 0835   Undermining Starts ___ O'Clock 7 10/04/21 1000   Undermining Ends___ O'Clock 9 10/04/21 1000   Undermining Maxium Distance (cm) 0.8 @7 10/04/21 1000   Wound Assessment [place]/red; [facility] 11/08/21 0835   Drainage Amount Moderate 11/08/21 0835   Drainage Description Serosanguinous 11/08/21 0835   Odor None 11/08/21 0835   Maria Del Carmen-wound Assessment Intact 11/08/21 0835   Margins Defined edges 11/08/21 0835   Wound Thickness Description not for Pressure Injury Full thickness 11/08/21 0835   Number of days: 38          Wound is healed    Plan:     Treatment Note please see Discharge Instructions    Written patient dismissal instructions given to patient and signed by patient or POA.            Electronically signed by Robert Soriano MD on 11/8/2021 at 8:59 AM

## 2021-11-08 NOTE — LETTER
[id number]  Phone: [phone]    Maty Holbrook MD    November 8, 2021     Lenora Walker MD  [id number] [address]    Patient: Mikayla Pascual   MR Number: [id number]   YOB: 1970   Date of Visit: 11/8/2021     To whom it may concern:    I have been treating Mr. Raúl Saha . Below are the relevant portions of my assessment and plan of care. The above patient has not been able to return to work from September 28 to November 1. Patient may return to work on November 1 without any restriction. If you have questions, please do not hesitate to call me. I look forward to following [place] along with you.     Sincerely,      Maty Holbrook MD

## 2021-11-08 NOTE — PLAN OF CARE
Problem: Wound:  Goal: Will show signs of wound healing; wound closure and no evidence of infection  Description: Will show signs of wound healing; wound closure and no evidence of infection  11/8/2021 0848 by Lula Englsih RN  Outcome: Completed  11/8/2021 0839 by Lula English RN  Outcome: Ongoing     Problem: Falls - Risk of:  Goal: Will remain free from falls  Description: Will remain free from falls  11/8/2021 0848 by Lula English RN  Outcome: Completed  11/8/2021 0839 by Lula English RN  Outcome: Ongoing

## 2022-01-20 ENCOUNTER — HOSPITAL ENCOUNTER (OUTPATIENT)
Age: 52
Discharge: HOME | End: 2022-01-22
Payer: COMMERCIAL

## 2022-01-20 ENCOUNTER — HOSPITAL ENCOUNTER (OUTPATIENT)
Dept: GENERAL RADIOLOGY | Age: 52
Discharge: HOME | End: 2022-01-22
Payer: COMMERCIAL

## 2022-01-20 ENCOUNTER — HOSPITAL ENCOUNTER (OUTPATIENT)
Age: 52
Discharge: HOME | End: 2022-01-20
Payer: COMMERCIAL

## 2022-01-20 DIAGNOSIS — R06.81 BREATHLESSNESS ON EXERTION: ICD-10-CM

## 2022-01-20 LAB
ABSOLUTE EOS #: 0.21 K/UL (ref 0–0.44)
ABSOLUTE IMMATURE GRANULOCYTE: <0.03 K/UL (ref 0–0.3)
ABSOLUTE LYMPH #: 1.6 K/UL (ref 1.1–3.7)
ABSOLUTE MONO #: 0.38 K/UL (ref 0.1–1.2)
BASOPHILS # BLD: 1 % (ref 0–2)
BASOPHILS ABSOLUTE: 0.03 K/UL (ref 0–0.2)
D-DIMER QUANTITATIVE: 0.35 MG/L FEU (ref 0–0.59)
DIFFERENTIAL TYPE: NORMAL
EOSINOPHILS RELATIVE PERCENT: 4 % (ref 1–4)
HCT VFR BLD CALC: 42.6 % (ref 40.7–50.3)
HEMOGLOBIN: 13.2 G/DL (ref 13–17)
IMMATURE GRANULOCYTES: 0 %
LYMPHOCYTES # BLD: 31 % (ref 24–43)
MCH RBC QN AUTO: 26.3 PG (ref 25.2–33.5)
MCHC RBC AUTO-ENTMCNC: 31 G/DL (ref 28.4–34.8)
MCV RBC AUTO: 85 FL (ref 82.6–102.9)
MONOCYTES # BLD: 7 % (ref 3–12)
NRBC AUTOMATED: 0 PER 100 WBC
PDW BLD-RTO: 13 % (ref 11.8–14.4)
PLATELET # BLD: 168 K/UL (ref 138–453)
PLATELET ESTIMATE: NORMAL
PMV BLD AUTO: 12.1 FL (ref 8.1–13.5)
RBC # BLD: 5.01 M/UL (ref 4.21–5.77)
RBC # BLD: NORMAL 10*6/UL
SEG NEUTROPHILS: 57 % (ref 36–65)
SEGMENTED NEUTROPHILS ABSOLUTE COUNT: 3.01 K/UL (ref 1.5–8.1)
THYROXINE, FREE: 1.42 NG/DL (ref 0.93–1.7)
TSH SERPL DL<=0.05 MIU/L-ACNC: 1.34 MIU/L (ref 0.3–5)
WBC # BLD: 5.2 K/UL (ref 3.5–11.3)
WBC # BLD: NORMAL 10*3/UL

## 2022-01-20 PROCEDURE — 85025 COMPLETE CBC W/AUTO DIFF WBC: CPT

## 2022-01-20 PROCEDURE — 84443 ASSAY THYROID STIM HORMONE: CPT

## 2022-01-20 PROCEDURE — 83020 HEMOGLOBIN ELECTROPHORESIS: CPT

## 2022-01-20 PROCEDURE — 84439 ASSAY OF FREE THYROXINE: CPT

## 2022-01-20 PROCEDURE — 71046 X-RAY EXAM CHEST 2 VIEWS: CPT

## 2022-01-20 PROCEDURE — 85379 FIBRIN DEGRADATION QUANT: CPT

## 2022-01-20 PROCEDURE — 36415 COLL VENOUS BLD VENIPUNCTURE: CPT

## 2022-01-21 LAB
HGB ELECTROPHORESIS INTERP: NORMAL
PATHOLOGIST: NORMAL

## 2023-11-24 ENCOUNTER — HOSPITAL ENCOUNTER (OUTPATIENT)
Age: 53
Discharge: HOME | End: 2023-11-24
Payer: COMMERCIAL

## 2023-11-24 LAB
ALBUMIN SERPL-MCNC: 4.1 G/DL (ref 3.5–5.2)
ALBUMIN/GLOB SERPL: 1 {RATIO} (ref 1–2.5)
ALP SERPL-CCNC: 73 U/L (ref 40–129)
ALT SERPL-CCNC: 15 U/L (ref 10–50)
ANION GAP SERPL CALCULATED.3IONS-SCNC: 7 MMOL/L (ref 9–16)
AST SERPL-CCNC: 25 U/L (ref 10–50)
BASOPHILS # BLD: 0.04 K/UL (ref 0–0.2)
BASOPHILS NFR BLD: 1 % (ref 0–2)
BILIRUB SERPL-MCNC: 0.5 MG/DL (ref 0–1.2)
BILIRUB UR QL STRIP: NEGATIVE
BUN SERPL-MCNC: 12 MG/DL (ref 6–20)
CALCIUM SERPL-MCNC: 9.1 MG/DL (ref 8.6–10.4)
CHLORIDE SERPL-SCNC: 104 MMOL/L (ref 98–107)
CHOLEST SERPL-MCNC: 205 MG/DL (ref 0–199)
CHOLESTEROL/HDL RATIO: 3
CLARITY UR: CLEAR
CO2 SERPL-SCNC: 29 MMOL/L (ref 20–31)
COLOR UR: YELLOW
COMMENT: NORMAL
CREAT SERPL-MCNC: 0.9 MG/DL (ref 0.7–1.2)
EOSINOPHIL # BLD: 0.41 K/UL (ref 0–0.44)
EOSINOPHILS RELATIVE PERCENT: 6 % (ref 1–4)
ERYTHROCYTE [DISTWIDTH] IN BLOOD BY AUTOMATED COUNT: 13 % (ref 11.8–14.4)
GFR SERPL CREATININE-BSD FRML MDRD: >60 ML/MIN/1.73M2
GLUCOSE SERPL-MCNC: 88 MG/DL (ref 74–99)
GLUCOSE UR STRIP-MCNC: NEGATIVE MG/DL
HCT VFR BLD AUTO: 44.3 % (ref 40.7–50.3)
HDLC SERPL-MCNC: 59 MG/DL
HGB BLD-MCNC: 14.2 G/DL (ref 13–17)
HGB UR QL STRIP.AUTO: NEGATIVE
IMM GRANULOCYTES # BLD AUTO: <0.03 K/UL (ref 0–0.3)
IMM GRANULOCYTES NFR BLD: 0 %
KETONES UR STRIP-MCNC: NEGATIVE MG/DL
LDLC SERPL CALC-MCNC: 131 MG/DL (ref 0–100)
LEUKOCYTE ESTERASE UR QL STRIP: NEGATIVE
LYMPHOCYTES NFR BLD: 1.99 K/UL (ref 1.1–3.7)
LYMPHOCYTES RELATIVE PERCENT: 30 % (ref 24–43)
MCH RBC QN AUTO: 26.7 PG (ref 25.2–33.5)
MCHC RBC AUTO-ENTMCNC: 32.1 G/DL (ref 28.4–34.8)
MCV RBC AUTO: 83.3 FL (ref 82.6–102.9)
MONOCYTES NFR BLD: 0.34 K/UL (ref 0.1–1.2)
MONOCYTES NFR BLD: 5 % (ref 3–12)
NEUTROPHILS NFR BLD: 58 % (ref 36–65)
NEUTS SEG NFR BLD: 3.78 K/UL (ref 1.5–8.1)
NITRITE UR QL STRIP: NEGATIVE
NRBC BLD-RTO: 0 PER 100 WBC
PH UR STRIP: 8 [PH] (ref 5–8)
PLATELET # BLD AUTO: 213 K/UL (ref 138–453)
PMV BLD AUTO: 11.5 FL (ref 8.1–13.5)
POTASSIUM SERPL-SCNC: 4.5 MMOL/L (ref 3.7–5.3)
PROT SERPL-MCNC: 6.9 G/DL (ref 6.6–8.7)
PROT UR STRIP-MCNC: NEGATIVE MG/DL
PSA SERPL-MCNC: 0.6 NG/ML (ref 0–4)
RBC # BLD AUTO: 5.32 M/UL (ref 4.21–5.77)
SODIUM SERPL-SCNC: 140 MMOL/L (ref 136–145)
SP GR UR STRIP: 1.02 (ref 1–1.03)
T4 FREE SERPL-MCNC: 1.3 NG/DL (ref 0.93–1.7)
TRIGL SERPL-MCNC: 73 MG/DL (ref 0–149)
TSH SERPL DL<=0.05 MIU/L-ACNC: 1.39 UIU/ML (ref 0.27–4.2)
UROBILINOGEN UR STRIP-ACNC: NORMAL EU/DL (ref 0–1)
VLDLC SERPL CALC-MCNC: 15 MG/DL
WBC OTHER # BLD: 6.6 K/UL (ref 3.5–11.3)

## 2023-11-24 PROCEDURE — 80061 LIPID PANEL: CPT

## 2023-11-24 PROCEDURE — 81003 URINALYSIS AUTO W/O SCOPE: CPT

## 2023-11-24 PROCEDURE — 84439 ASSAY OF FREE THYROXINE: CPT

## 2023-11-24 PROCEDURE — 80053 COMPREHEN METABOLIC PANEL: CPT

## 2023-11-24 PROCEDURE — 84443 ASSAY THYROID STIM HORMONE: CPT

## 2023-11-24 PROCEDURE — G0103 PSA SCREENING: HCPCS

## 2023-11-24 PROCEDURE — 36415 COLL VENOUS BLD VENIPUNCTURE: CPT

## 2023-11-24 PROCEDURE — 85025 COMPLETE CBC W/AUTO DIFF WBC: CPT

## 2023-12-01 ENCOUNTER — HOSPITAL ENCOUNTER (OUTPATIENT)
Dept: ULTRASOUND IMAGING | Age: 53
Discharge: HOME | End: 2023-12-01
Attending: INTERNAL MEDICINE
Payer: COMMERCIAL

## 2023-12-01 DIAGNOSIS — R22.2 LOCALIZED SWELLING, MASS AND LUMP, TRUNK: ICD-10-CM

## 2023-12-01 PROCEDURE — 76705 ECHO EXAM OF ABDOMEN: CPT
